# Patient Record
Sex: FEMALE | Race: WHITE | NOT HISPANIC OR LATINO | Employment: OTHER | URBAN - METROPOLITAN AREA
[De-identification: names, ages, dates, MRNs, and addresses within clinical notes are randomized per-mention and may not be internally consistent; named-entity substitution may affect disease eponyms.]

---

## 2017-06-20 ENCOUNTER — OFFICE VISIT (OUTPATIENT)
Dept: SURGERY | Facility: CLINIC | Age: 72
End: 2017-06-20

## 2017-06-20 VITALS
SYSTOLIC BLOOD PRESSURE: 110 MMHG | HEART RATE: 74 BPM | DIASTOLIC BLOOD PRESSURE: 60 MMHG | WEIGHT: 160.3 LBS | HEIGHT: 68 IN | BODY MASS INDEX: 24.29 KG/M2 | OXYGEN SATURATION: 97 %

## 2017-06-20 DIAGNOSIS — K42.9 UMBILICAL HERNIA WITHOUT OBSTRUCTION AND WITHOUT GANGRENE: Primary | ICD-10-CM

## 2017-06-20 PROCEDURE — 99203 OFFICE O/P NEW LOW 30 MIN: CPT | Performed by: SURGERY

## 2017-06-20 RX ORDER — ASPIRIN 81 MG/1
81 TABLET ORAL DAILY
COMMUNITY
End: 2018-09-17

## 2017-06-20 RX ORDER — FLUTICASONE PROPIONATE 50 MCG
1 SPRAY, SUSPENSION (ML) NASAL EVERY EVENING
COMMUNITY
End: 2019-06-25

## 2017-06-20 RX ORDER — MONTELUKAST SODIUM 10 MG/1
10 TABLET ORAL NIGHTLY
COMMUNITY

## 2017-06-20 RX ORDER — EZETIMIBE 10 MG/1
10 TABLET ORAL DAILY
COMMUNITY
End: 2017-09-21

## 2017-06-20 RX ORDER — HYDROCODONE BITARTRATE AND ACETAMINOPHEN 5; 325 MG/1; MG/1
1 TABLET ORAL
COMMUNITY
End: 2017-07-06

## 2017-06-20 RX ORDER — MULTIVIT-MIN/IRON/FOLIC/HRB186 3.3 MG-25
1 TABLET ORAL DAILY
COMMUNITY

## 2017-06-20 RX ORDER — GABAPENTIN 100 MG/1
100 CAPSULE ORAL 3 TIMES DAILY
COMMUNITY
End: 2017-07-06

## 2017-06-20 RX ORDER — ROSUVASTATIN CALCIUM 5 MG/1
5 TABLET, COATED ORAL
COMMUNITY

## 2017-06-20 RX ORDER — LATANOPROST 50 UG/ML
1 SOLUTION/ DROPS OPHTHALMIC
COMMUNITY
End: 2017-07-06

## 2017-06-20 RX ORDER — CHLORAL HYDRATE 500 MG
2 CAPSULE ORAL
COMMUNITY
End: 2017-07-06

## 2017-06-20 RX ORDER — ALPRAZOLAM 0.5 MG/1
0.5 TABLET ORAL 3 TIMES DAILY PRN
Status: ON HOLD | COMMUNITY
End: 2017-11-16

## 2017-06-20 NOTE — PROGRESS NOTES
Subjective   Elsie Burleson is a 72 y.o. female who presents to the office in surgical consultation from Zoran Piedra MD for an umbilical hernia.    History of Present Illness     The patient has had a several month history of mild discomfort at the umbilicus.  The discomfort was present when pressure was applied especially with the waistband of pants.  Over the past 3 weeks the discomfort became pain.  She has also noticed a bulge at the umbilicus.  For a short period of time and it was firm but then reduced and has been much softer.  She has not had any changes in her bowel or bladder function.    The patient also has chronic back pain and has had an epidural injection which provided relief.  It has now recurred and she is scheduled to have another epidural injection.  She was given hydrocodone to help relieve the pain and has developed nausea that she attributes to the hydrocodone.  Her appetite has been good and eating does not change the nausea.  Her bowel function has remained normal.    Review of Systems   Constitutional: Negative for activity change, appetite change, fatigue and fever.   HENT: Negative for trouble swallowing and voice change.    Respiratory: Negative for chest tightness and shortness of breath.    Cardiovascular: Negative for chest pain and palpitations.   Gastrointestinal: Negative for abdominal pain, blood in stool, constipation, diarrhea, nausea and vomiting.   Endocrine: Negative for cold intolerance and heat intolerance.   Genitourinary: Negative for dysuria and flank pain.   Neurological: Negative for dizziness and light-headedness.   Hematological: Negative for adenopathy. Does not bruise/bleed easily.   Psychiatric/Behavioral: Negative for agitation and confusion.     Past Medical History:   Diagnosis Date   • Abdominal pain    • Arthritis    • Sleep apnea      Past Surgical History:   Procedure Laterality Date   • APPENDECTOMY     • COLONOSCOPY     • FOOT SURGERY     •  HYSTERECTOMY     • PELVIC FLOOR REPAIR     • SHOULDER ARTHROSCOPY       Family History   Problem Relation Age of Onset   • Heart disease Mother    • Heart disease Father    • Heart disease Brother      Social History     Social History   • Marital status:      Spouse name: N/A   • Number of children: N/A   • Years of education: N/A     Occupational History   • retired      Social History Main Topics   • Smoking status: Former Smoker   • Smokeless tobacco: Never Used   • Alcohol use 0.6 oz/week     1 Glasses of wine per week      Comment: per day   • Drug use: No   • Sexual activity: Defer     Other Topics Concern   • Not on file     Social History Narrative   • No narrative on file       Objective   Physical Exam   Constitutional: She is oriented to person, place, and time. She appears well-developed and well-nourished.  Non-toxic appearance.   Eyes: EOM are normal. No scleral icterus.   Pulmonary/Chest: Effort normal. No respiratory distress.   Abdominal: Soft. Normal appearance. There is no tenderness. A hernia is present. Hernia confirmed positive in the ventral area (Small reducible umbilical hernia that is very tender to palpation).   Neurological: She is alert and oriented to person, place, and time.   Skin: Skin is warm and dry.   Psychiatric: She has a normal mood and affect. Her behavior is normal. Judgment and thought content normal.       Assessment/Plan       The encounter diagnosis was Umbilical hernia without obstruction and without gangrene.    The patient has a symptomatic umbilical hernia.  She has been scheduled for an umbilical hernia repair.  The patient understands the indications, alternatives, risks, and benefits of the procedure and wishes to proceed.

## 2017-07-06 ENCOUNTER — APPOINTMENT (OUTPATIENT)
Dept: PREADMISSION TESTING | Facility: HOSPITAL | Age: 72
End: 2017-07-06

## 2017-07-06 VITALS
BODY MASS INDEX: 23.69 KG/M2 | RESPIRATION RATE: 16 BRPM | OXYGEN SATURATION: 98 % | SYSTOLIC BLOOD PRESSURE: 127 MMHG | TEMPERATURE: 97.6 F | DIASTOLIC BLOOD PRESSURE: 66 MMHG | HEIGHT: 68 IN | WEIGHT: 156.3 LBS | HEART RATE: 79 BPM

## 2017-07-06 LAB
ANION GAP SERPL CALCULATED.3IONS-SCNC: 16.1 MMOL/L
BUN BLD-MCNC: 19 MG/DL (ref 8–23)
BUN/CREAT SERPL: 25 (ref 7–25)
CALCIUM SPEC-SCNC: 9.8 MG/DL (ref 8.6–10.5)
CHLORIDE SERPL-SCNC: 102 MMOL/L (ref 98–107)
CO2 SERPL-SCNC: 20.9 MMOL/L (ref 22–29)
CREAT BLD-MCNC: 0.76 MG/DL (ref 0.57–1)
DEPRECATED RDW RBC AUTO: 42.1 FL (ref 37–54)
ERYTHROCYTE [DISTWIDTH] IN BLOOD BY AUTOMATED COUNT: 12.4 % (ref 11.7–13)
GFR SERPL CREATININE-BSD FRML MDRD: 75 ML/MIN/1.73
GLUCOSE BLD-MCNC: 114 MG/DL (ref 65–99)
HCT VFR BLD AUTO: 41.2 % (ref 35.6–45.5)
HGB BLD-MCNC: 13.8 G/DL (ref 11.9–15.5)
MCH RBC QN AUTO: 31.2 PG (ref 26.9–32)
MCHC RBC AUTO-ENTMCNC: 33.5 G/DL (ref 32.4–36.3)
MCV RBC AUTO: 93 FL (ref 80.5–98.2)
PLATELET # BLD AUTO: 254 10*3/MM3 (ref 140–500)
PMV BLD AUTO: 10.5 FL (ref 6–12)
POTASSIUM BLD-SCNC: 4.2 MMOL/L (ref 3.5–5.2)
RBC # BLD AUTO: 4.43 10*6/MM3 (ref 3.9–5.2)
SODIUM BLD-SCNC: 139 MMOL/L (ref 136–145)
WBC NRBC COR # BLD: 8.51 10*3/MM3 (ref 4.5–10.7)

## 2017-07-06 PROCEDURE — 93005 ELECTROCARDIOGRAM TRACING: CPT

## 2017-07-06 PROCEDURE — 93010 ELECTROCARDIOGRAM REPORT: CPT | Performed by: INTERNAL MEDICINE

## 2017-07-06 PROCEDURE — 36415 COLL VENOUS BLD VENIPUNCTURE: CPT

## 2017-07-06 PROCEDURE — 85027 COMPLETE CBC AUTOMATED: CPT | Performed by: SURGERY

## 2017-07-06 PROCEDURE — 80048 BASIC METABOLIC PNL TOTAL CA: CPT | Performed by: SURGERY

## 2017-07-06 NOTE — DISCHARGE INSTRUCTIONS
Take the following medications the morning of surgery with a small sip of water:  NEXIUM    Arrive to hospital on your day of surgery at 7:30 AM    General Instructions:  • Do not eat or drink anything after midnight the night before surgery.  • Infants may have breast milk up to four hours before surgery.  • Infants drinking formula may drink formula up to six hours before surgery.   • Patients who avoid smoking, chewing tobacco and alcohol for 4 weeks prior to surgery have a reduced risk of post-operative complications.  Quit smoking as many days before surgery as you can.  • Do not smoke, use chewing tobacco or drink alcohol the day of surgery.   • If applicable bring your C-PAP/ BI-PAP machine.  • Bring any papers given to you in the doctor’s office.  • Wear clean comfortable clothes and socks.  • Do not wear contact lenses or make-up.  Bring a case for your glasses.   • Bring crutches or walker if applicable.  • Leave all other valuables and jewelry at home.  • The Pre-Admission Testing nurse will instruct you to bring medications if unable to obtain an accurate list in Pre-Admission Testing.        If you were given a blood bank ID arm band remember to bring it with you the day of surgery.    Preventing a Surgical Site Infection:  • For 2 to 3 days before surgery, avoid shaving with a razor because the razor can irritate skin and make it easier to develop an infection.  • The night prior to surgery sleep in a clean bed with clean clothing.  Do not allow pets to sleep with you.  • Shower on the morning of surgery using a fresh bar of anti-bacterial soap (such as Dial) and clean washcloth.  Dry with a clean towel and dress in clean clothing.  • Ask your surgeon if you will be receiving antibiotics prior to surgery.  • Make sure you, your family, and all healthcare providers clean their hands with soap and water or an alcohol based hand  before caring for you or your wound.    Day of surgery:  Upon  arrival, a Pre-op nurse and Anesthesiologist will review your health history, obtain vital signs, and answer questions you may have.  The only belongings needed at this time will be your home medications and if applicable your C-PAP/BI-PAP machine.  If you are staying overnight your family can leave the rest of your belongings in the car and bring them to your room later.  A Pre-op nurse will start an IV and you may receive medication in preparation for surgery, including something to help you relax.  Your family will be able to see you in the Pre-op area.  While you are in surgery your family should notify the waiting room  if they leave the waiting room area and provide a contact phone number.    Please be aware that surgery does come with discomfort.  We want to make every effort to control your discomfort so please discuss any uncontrolled symptoms with your nurse.   Your doctor will most likely have prescribed pain medications.      If you are going home after surgery you will receive individualized written care instructions before being discharged.  A responsible adult must drive you to and from the hospital on the day of your surgery and stay with you for 24 hours.    If you are staying overnight following surgery, you will be transported to your hospital room following the recovery period.  [facility] has all private rooms.    If you have any questions please call Pre-Admission Testing at [phone].  Deductibles and co-payments are collected on the day of service. Please be prepared to pay the required co-pay, deductible or deposit on the day of service as defined by your plan.

## 2017-07-17 ENCOUNTER — HOSPITAL ENCOUNTER (OUTPATIENT)
Facility: HOSPITAL | Age: 72
Setting detail: HOSPITAL OUTPATIENT SURGERY
Discharge: HOME | End: 2017-07-17
Attending: SURGERY | Admitting: SURGERY

## 2017-07-17 ENCOUNTER — ANESTHESIA EVENT (OUTPATIENT)
Dept: PERIOP | Facility: HOSPITAL | Age: 72
End: 2017-07-17

## 2017-07-17 ENCOUNTER — ANESTHESIA (OUTPATIENT)
Dept: PERIOP | Facility: HOSPITAL | Age: 72
End: 2017-07-17

## 2017-07-17 VITALS
WEIGHT: 155 LBS | RESPIRATION RATE: 18 BRPM | DIASTOLIC BLOOD PRESSURE: 65 MMHG | BODY MASS INDEX: 23.49 KG/M2 | HEART RATE: 64 BPM | TEMPERATURE: 97.6 F | HEIGHT: 68 IN | OXYGEN SATURATION: 97 % | SYSTOLIC BLOOD PRESSURE: 123 MMHG

## 2017-07-17 PROCEDURE — 25010000002 DEXAMETHASONE PER 1 MG: Performed by: NURSE ANESTHETIST, CERTIFIED REGISTERED

## 2017-07-17 PROCEDURE — 63710000001 PROMETHAZINE PER 25 MG: Performed by: NURSE ANESTHETIST, CERTIFIED REGISTERED

## 2017-07-17 PROCEDURE — 25010000002 PROPOFOL 10 MG/ML EMULSION: Performed by: NURSE ANESTHETIST, CERTIFIED REGISTERED

## 2017-07-17 PROCEDURE — 25010000002 KETOROLAC TROMETHAMINE PER 15 MG: Performed by: NURSE ANESTHETIST, CERTIFIED REGISTERED

## 2017-07-17 PROCEDURE — C1781 MESH (IMPLANTABLE): HCPCS | Performed by: SURGERY

## 2017-07-17 PROCEDURE — 25010000002 FENTANYL CITRATE (PF) 100 MCG/2ML SOLUTION: Performed by: NURSE ANESTHETIST, CERTIFIED REGISTERED

## 2017-07-17 PROCEDURE — 49585 PR REPAIR UMBILICAL HERN,5+Y/O,REDUC: CPT | Performed by: SURGERY

## 2017-07-17 PROCEDURE — 25010000002 MIDAZOLAM PER 1 MG

## 2017-07-17 PROCEDURE — 25010000002 ONDANSETRON PER 1 MG: Performed by: NURSE ANESTHETIST, CERTIFIED REGISTERED

## 2017-07-17 DEVICE — VENTRALEX ST HERNIA PATCH
Type: IMPLANTABLE DEVICE | Status: FUNCTIONAL
Brand: VENTRALEX ST HERNIA PATCH

## 2017-07-17 RX ORDER — PROMETHAZINE HYDROCHLORIDE 25 MG/1
TABLET ORAL
Status: DISCONTINUED
Start: 2017-07-17 | End: 2017-07-17 | Stop reason: HOSPADM

## 2017-07-17 RX ORDER — LABETALOL HYDROCHLORIDE 5 MG/ML
5 INJECTION, SOLUTION INTRAVENOUS
Status: DISCONTINUED | OUTPATIENT
Start: 2017-07-17 | End: 2017-07-17 | Stop reason: HOSPADM

## 2017-07-17 RX ORDER — FAMOTIDINE 10 MG/ML
INJECTION, SOLUTION INTRAVENOUS
Status: DISCONTINUED
Start: 2017-07-17 | End: 2017-07-17 | Stop reason: HOSPADM

## 2017-07-17 RX ORDER — EPHEDRINE SULFATE 50 MG/ML
5 INJECTION, SOLUTION INTRAVENOUS ONCE AS NEEDED
Status: DISCONTINUED | OUTPATIENT
Start: 2017-07-17 | End: 2017-07-17 | Stop reason: HOSPADM

## 2017-07-17 RX ORDER — SODIUM CHLORIDE, SODIUM LACTATE, POTASSIUM CHLORIDE, CALCIUM CHLORIDE 600; 310; 30; 20 MG/100ML; MG/100ML; MG/100ML; MG/100ML
9 INJECTION, SOLUTION INTRAVENOUS CONTINUOUS PRN
Status: DISCONTINUED | OUTPATIENT
Start: 2017-07-17 | End: 2017-07-17 | Stop reason: HOSPADM

## 2017-07-17 RX ORDER — PROPOFOL 10 MG/ML
VIAL (ML) INTRAVENOUS AS NEEDED
Status: DISCONTINUED | OUTPATIENT
Start: 2017-07-17 | End: 2017-07-17 | Stop reason: SURG

## 2017-07-17 RX ORDER — BUPIVACAINE HYDROCHLORIDE AND EPINEPHRINE 5; 5 MG/ML; UG/ML
INJECTION, SOLUTION PERINEURAL AS NEEDED
Status: DISCONTINUED | OUTPATIENT
Start: 2017-07-17 | End: 2017-07-17 | Stop reason: HOSPADM

## 2017-07-17 RX ORDER — OXYCODONE HYDROCHLORIDE AND ACETAMINOPHEN 5; 325 MG/1; MG/1
TABLET ORAL
Qty: 30 TABLET | Refills: 0 | Status: SHIPPED | OUTPATIENT
Start: 2017-07-17 | End: 2017-09-20

## 2017-07-17 RX ORDER — LIDOCAINE HYDROCHLORIDE 20 MG/ML
INJECTION, SOLUTION INFILTRATION; PERINEURAL AS NEEDED
Status: DISCONTINUED | OUTPATIENT
Start: 2017-07-17 | End: 2017-07-17 | Stop reason: SURG

## 2017-07-17 RX ORDER — NALOXONE HCL 0.4 MG/ML
0.2 VIAL (ML) INJECTION AS NEEDED
Status: DISCONTINUED | OUTPATIENT
Start: 2017-07-17 | End: 2017-07-17 | Stop reason: HOSPADM

## 2017-07-17 RX ORDER — SODIUM CHLORIDE 0.9 % (FLUSH) 0.9 %
1-10 SYRINGE (ML) INJECTION AS NEEDED
Status: DISCONTINUED | OUTPATIENT
Start: 2017-07-17 | End: 2017-07-17 | Stop reason: HOSPADM

## 2017-07-17 RX ORDER — ONDANSETRON 2 MG/ML
4 INJECTION INTRAMUSCULAR; INTRAVENOUS ONCE AS NEEDED
Status: DISCONTINUED | OUTPATIENT
Start: 2017-07-17 | End: 2017-07-17 | Stop reason: HOSPADM

## 2017-07-17 RX ORDER — HYDRALAZINE HYDROCHLORIDE 20 MG/ML
5 INJECTION INTRAMUSCULAR; INTRAVENOUS
Status: DISCONTINUED | OUTPATIENT
Start: 2017-07-17 | End: 2017-07-17 | Stop reason: HOSPADM

## 2017-07-17 RX ORDER — FENTANYL CITRATE 50 UG/ML
INJECTION, SOLUTION INTRAMUSCULAR; INTRAVENOUS AS NEEDED
Status: DISCONTINUED | OUTPATIENT
Start: 2017-07-17 | End: 2017-07-17 | Stop reason: SURG

## 2017-07-17 RX ORDER — ONDANSETRON 2 MG/ML
INJECTION INTRAMUSCULAR; INTRAVENOUS AS NEEDED
Status: DISCONTINUED | OUTPATIENT
Start: 2017-07-17 | End: 2017-07-17 | Stop reason: SURG

## 2017-07-17 RX ORDER — FLUMAZENIL 0.1 MG/ML
0.2 INJECTION INTRAVENOUS AS NEEDED
Status: DISCONTINUED | OUTPATIENT
Start: 2017-07-17 | End: 2017-07-17 | Stop reason: HOSPADM

## 2017-07-17 RX ORDER — DIPHENHYDRAMINE HYDROCHLORIDE 50 MG/ML
12.5 INJECTION INTRAMUSCULAR; INTRAVENOUS
Status: DISCONTINUED | OUTPATIENT
Start: 2017-07-17 | End: 2017-07-17 | Stop reason: HOSPADM

## 2017-07-17 RX ORDER — OXYCODONE AND ACETAMINOPHEN 7.5; 325 MG/1; MG/1
1 TABLET ORAL ONCE AS NEEDED
Status: DISCONTINUED | OUTPATIENT
Start: 2017-07-17 | End: 2017-07-17 | Stop reason: HOSPADM

## 2017-07-17 RX ORDER — DEXAMETHASONE SODIUM PHOSPHATE 10 MG/ML
INJECTION INTRAMUSCULAR; INTRAVENOUS AS NEEDED
Status: DISCONTINUED | OUTPATIENT
Start: 2017-07-17 | End: 2017-07-17 | Stop reason: SURG

## 2017-07-17 RX ORDER — MIDAZOLAM HYDROCHLORIDE 1 MG/ML
INJECTION INTRAMUSCULAR; INTRAVENOUS
Status: COMPLETED
Start: 2017-07-17 | End: 2017-07-17

## 2017-07-17 RX ORDER — MAGNESIUM HYDROXIDE 1200 MG/15ML
LIQUID ORAL AS NEEDED
Status: DISCONTINUED | OUTPATIENT
Start: 2017-07-17 | End: 2017-07-17 | Stop reason: HOSPADM

## 2017-07-17 RX ORDER — KETOROLAC TROMETHAMINE 30 MG/ML
INJECTION, SOLUTION INTRAMUSCULAR; INTRAVENOUS AS NEEDED
Status: DISCONTINUED | OUTPATIENT
Start: 2017-07-17 | End: 2017-07-17 | Stop reason: SURG

## 2017-07-17 RX ORDER — HYDROCODONE BITARTRATE AND ACETAMINOPHEN 7.5; 325 MG/1; MG/1
1 TABLET ORAL ONCE AS NEEDED
Status: COMPLETED | OUTPATIENT
Start: 2017-07-17 | End: 2017-07-17

## 2017-07-17 RX ORDER — HYDROMORPHONE HYDROCHLORIDE 1 MG/ML
0.5 INJECTION, SOLUTION INTRAMUSCULAR; INTRAVENOUS; SUBCUTANEOUS
Status: DISCONTINUED | OUTPATIENT
Start: 2017-07-17 | End: 2017-07-17 | Stop reason: HOSPADM

## 2017-07-17 RX ORDER — PROMETHAZINE HYDROCHLORIDE 25 MG/1
12.5 TABLET ORAL ONCE AS NEEDED
Status: COMPLETED | OUTPATIENT
Start: 2017-07-17 | End: 2017-07-17

## 2017-07-17 RX ORDER — MIDAZOLAM HYDROCHLORIDE 1 MG/ML
1 INJECTION INTRAMUSCULAR; INTRAVENOUS
Status: DISCONTINUED | OUTPATIENT
Start: 2017-07-17 | End: 2017-07-17 | Stop reason: HOSPADM

## 2017-07-17 RX ORDER — MIDAZOLAM HYDROCHLORIDE 1 MG/ML
2 INJECTION INTRAMUSCULAR; INTRAVENOUS
Status: DISCONTINUED | OUTPATIENT
Start: 2017-07-17 | End: 2017-07-17 | Stop reason: HOSPADM

## 2017-07-17 RX ORDER — FENTANYL CITRATE 50 UG/ML
50 INJECTION, SOLUTION INTRAMUSCULAR; INTRAVENOUS
Status: DISCONTINUED | OUTPATIENT
Start: 2017-07-17 | End: 2017-07-17 | Stop reason: HOSPADM

## 2017-07-17 RX ORDER — GLYCOPYRROLATE 0.2 MG/ML
INJECTION INTRAMUSCULAR; INTRAVENOUS AS NEEDED
Status: DISCONTINUED | OUTPATIENT
Start: 2017-07-17 | End: 2017-07-17 | Stop reason: SURG

## 2017-07-17 RX ADMIN — SODIUM CHLORIDE, POTASSIUM CHLORIDE, SODIUM LACTATE AND CALCIUM CHLORIDE: 600; 310; 30; 20 INJECTION, SOLUTION INTRAVENOUS at 08:41

## 2017-07-17 RX ADMIN — HYDROCODONE BITARTRATE AND ACETAMINOPHEN 1 TABLET: 7.5; 325 TABLET ORAL at 10:37

## 2017-07-17 RX ADMIN — GLYCOPYRROLATE 0.2 MG: 0.2 INJECTION INTRAMUSCULAR; INTRAVENOUS at 09:32

## 2017-07-17 RX ADMIN — PROPOFOL 200 MG: 10 INJECTION, EMULSION INTRAVENOUS at 08:46

## 2017-07-17 RX ADMIN — FENTANYL CITRATE 50 MCG: 50 INJECTION INTRAMUSCULAR; INTRAVENOUS at 08:57

## 2017-07-17 RX ADMIN — MIDAZOLAM HYDROCHLORIDE 1 MG: 1 INJECTION INTRAMUSCULAR; INTRAVENOUS at 08:33

## 2017-07-17 RX ADMIN — DEXAMETHASONE SODIUM PHOSPHATE 8 MG: 10 INJECTION INTRAMUSCULAR; INTRAVENOUS at 09:29

## 2017-07-17 RX ADMIN — LIDOCAINE HYDROCHLORIDE 60 MG: 20 INJECTION, SOLUTION INFILTRATION; PERINEURAL at 08:46

## 2017-07-17 RX ADMIN — SODIUM CHLORIDE, POTASSIUM CHLORIDE, SODIUM LACTATE AND CALCIUM CHLORIDE: 600; 310; 30; 20 INJECTION, SOLUTION INTRAVENOUS at 09:43

## 2017-07-17 RX ADMIN — PROMETHAZINE HYDROCHLORIDE 12.5 MG: 25 TABLET ORAL at 10:04

## 2017-07-17 RX ADMIN — KETOROLAC TROMETHAMINE 15 MG: 30 INJECTION, SOLUTION INTRAMUSCULAR; INTRAVENOUS at 09:29

## 2017-07-17 RX ADMIN — ONDANSETRON 4 MG: 2 INJECTION INTRAMUSCULAR; INTRAVENOUS at 09:29

## 2017-07-17 RX ADMIN — FENTANYL CITRATE 50 MCG: 50 INJECTION INTRAMUSCULAR; INTRAVENOUS at 10:35

## 2017-07-17 RX ADMIN — FENTANYL CITRATE 50 MCG: 50 INJECTION INTRAMUSCULAR; INTRAVENOUS at 10:13

## 2017-07-17 RX ADMIN — MIDAZOLAM 1 MG: 1 INJECTION INTRAMUSCULAR; INTRAVENOUS at 08:33

## 2017-07-17 NOTE — PLAN OF CARE
Problem: Patient Care Overview (Adult)  Goal: Plan of Care Review  Outcome: Ongoing (interventions implemented as appropriate)    07/17/17 1040   Coping/Psychosocial Response Interventions   Plan Of Care Reviewed With patient   Patient Care Overview   Progress improving   Outcome Evaluation   Outcome Summary/Follow up Plan decrease nausea, pain tolerable, vss         Problem: Perioperative Period (Adult)  Goal: Signs and Symptoms of Listed Potential Problems Will be Absent or Manageable (Perioperative Period)  Outcome: Ongoing (interventions implemented as appropriate)

## 2017-07-17 NOTE — PLAN OF CARE
Problem: Patient Care Overview (Adult)  Goal: Plan of Care Review  Outcome: Outcome(s) achieved Date Met:  07/17/17 07/17/17 1148   Coping/Psychosocial Response Interventions   Plan Of Care Reviewed With patient;spouse   Outcome Evaluation   Outcome Summary/Follow up Plan READY FOR DISCHARGE

## 2017-07-17 NOTE — PLAN OF CARE
Problem: Perioperative Period (Adult)  Goal: Signs and Symptoms of Listed Potential Problems Will be Absent or Manageable (Perioperative Period)  Outcome: Outcome(s) achieved Date Met:  07/17/17 07/17/17 1148   Perioperative Period   Problems Present (Perioperative Period) none

## 2017-07-17 NOTE — OP NOTE
Surgeon: Haseeb Monterroso Jr., M.D.    Assistant: None    Pre-Operative Diagnosis: Umbilical hernia    Post-Operative Diagnosis: Umbilical hernia    Procedure Performed: Umbilical hernia repair with mesh    Indications:     The patient is a very pleasant 72-year-old white female who is had a bulge at the umbilicus that has slowly gotten larger and increasingly symptomatic.  She was diagnosed with an umbilical hernia.  She presents for umbilical hernia repair with mesh.  The patient understands the indications, alternatives, risks, and benefits of the procedure and wishes to proceed.    Procedure:     The patient was identified and taken to the operating room where she was placed in the supine position on the operating table.  Monitors were placed and she underwent general endotracheal anesthesia and was appropriately monitored by the anesthesia personnel.  The abdomen and the peña-umbilical region was prepped and draped in the standard surgical fashion.  A periumbilical incision was made and carried through the skin into the subcutaneous tissue.  The subcutaneous tissue was divided using Bovie electrocautery down to the level of the fascia and the hernia was surrounded.  The umbilicus was then taken off the hernia sac using Bovie electrocautery.  The hernia sac was freed from all subcutaneous attachments all the way down to the fascial defect.  The hernia was freed at the fascial edge using Bovie electrocautery and completely reduced.  The hernia defect was about 4 cm in size.  The preperitoneal space was dissected to permit the mesh.  A piece of Ventralex ST mesh, size medium, was selected and placed in a subfascial position in the preperitoneal space.  The fascia was then closed with 0 PDS sutures in an interrupted fashion grasping the legs of the mesh within the closure to secure the mesh in the proper position.  The entire area was then infiltrated with 0.5% Marcaine with epinephrine.  The umbilicus was tacked  down to the fascia using 3-0 Vicryls suture in an interrupted fashion.  The subcutaneous tissue was then closed with 3-0 Vicryls suture in a running fashion.  The skin was then closed with a 4-0 Monocryl in a subcuticular fashion followed by benzoin and Steri-Strips.  A sterile dressing was applied.  The sponge, needle, and instrument counts were correct at the end the case.  The patient tolerated procedure well and was transferred to the recovery room in stable condition.

## 2017-07-17 NOTE — PLAN OF CARE
Problem: Perioperative Period (Adult)  Goal: Signs and Symptoms of Listed Potential Problems Will be Absent or Manageable (Perioperative Period)  Outcome: Ongoing (interventions implemented as appropriate)    07/17/17 0820   Perioperative Period   Problems Assessed (Perioperative Period) pain   Problems Present (Perioperative Period) none

## 2017-07-17 NOTE — ANESTHESIA PROCEDURE NOTES
Airway  Urgency: elective    Airway not difficult    General Information and Staff    Patient location during procedure: OR  Anesthesiologist: DEBORAH DUNBAR  CRNA: BRENDON HARMON    Indications and Patient Condition  Indications for airway management: airway protection    Preoxygenated: yes  MILS maintained throughout  Mask difficulty assessment: 1 - vent by mask    Final Airway Details  Final airway type: supraglottic airway      Successful airway: classic  Size 4    Number of attempts at approach: 1    Additional Comments  Pt preoxygenated, sivi, LMA placed with adequate seal and TV

## 2017-07-17 NOTE — ANESTHESIA PREPROCEDURE EVALUATION
Anesthesia Evaluation     Patient summary reviewed   NPO Solid Status: > 8 hours       Airway   Mallampati: I  Neck ROM: full  no difficulty expected  Dental - normal exam     Pulmonary    (+) sleep apnea,   Cardiovascular     Rhythm: regular        Neuro/Psych  GI/Hepatic/Renal/Endo      Musculoskeletal     Abdominal    Substance History      OB/GYN          Other                                        Anesthesia Plan    ASA 2     general     intravenous induction   Anesthetic plan and risks discussed with patient.  Use of blood products discussed with patient .

## 2017-07-17 NOTE — ANESTHESIA POSTPROCEDURE EVALUATION
Patient: Elsie Burleson    Procedure Summary     Date Anesthesia Start Anesthesia Stop Room / Location    07/17/17 0841 [id number]  [facility]       Procedure Diagnosis Surgeon Provider    UMBILICAL HERNIA REPAIR (N/A Abdomen) Umbilical hernia without obstruction and without gangrene  (Umbilical hernia without obstruction and without gangrene [K42.9]) MD Lucas Lala Jr., MD          Anesthesia Type: general  Last vitals  /65 (07/17/17 1140)    Temp      Pulse 64 (07/17/17 1140)   Resp 18 (07/17/17 1140)    SpO2 97 % (07/17/17 1140)      Post Anesthesia Care and Evaluation    Patient location during evaluation: PACU  Patient participation: complete - patient participated  Level of consciousness: sleepy but conscious  Pain score: 0  Pain management: adequate  Airway patency: patent  Anesthetic complications: No anesthetic complications    Cardiovascular status: acceptable  Respiratory status: acceptable  Hydration status: acceptable

## 2017-07-17 NOTE — PLAN OF CARE
Problem: Patient Care Overview (Adult)  Goal: Discharge Needs Assessment  Outcome: Outcome(s) achieved Date Met:  07/17/17 07/17/17 1149   Discharge Needs Assessment   Concerns To Be Addressed no discharge needs identified

## 2017-07-17 NOTE — PLAN OF CARE
Problem: Patient Care Overview (Adult)  Goal: Plan of Care Review  Outcome: Ongoing (interventions implemented as appropriate)    07/17/17 0820   Coping/Psychosocial Response Interventions   Plan Of Care Reviewed With patient   Patient Care Overview   Progress no change       Goal: Adult Individualization and Mutuality  Outcome: Ongoing (interventions implemented as appropriate)    07/17/17 0820   Individualization   Patient Specific Preferences goes by Cristino,  goes by Ramana

## 2017-07-27 ENCOUNTER — OFFICE VISIT (OUTPATIENT)
Dept: SURGERY | Facility: CLINIC | Age: 72
End: 2017-07-27

## 2017-07-27 VITALS
DIASTOLIC BLOOD PRESSURE: 74 MMHG | HEART RATE: 77 BPM | HEIGHT: 68 IN | OXYGEN SATURATION: 98 % | SYSTOLIC BLOOD PRESSURE: 122 MMHG | WEIGHT: 152.9 LBS | BODY MASS INDEX: 23.17 KG/M2

## 2017-07-27 DIAGNOSIS — Z48.89 POSTOPERATIVE VISIT: Primary | ICD-10-CM

## 2017-07-27 PROCEDURE — 99024 POSTOP FOLLOW-UP VISIT: CPT | Performed by: SURGERY

## 2017-07-27 RX ORDER — ONDANSETRON 4 MG/1
4 TABLET, FILM COATED ORAL EVERY 6 HOURS PRN
Qty: 20 TABLET | Refills: 0 | Status: ON HOLD | OUTPATIENT
Start: 2017-07-27 | End: 2017-11-16

## 2017-07-27 NOTE — PROGRESS NOTES
Subjective   Elsie Burleson is a 72 y.o. female who returns to the office after undergoing an umbilical hernia repair with mesh on 7/17/2017.     History of Present Illness     The patient is recovering well from surgery but has developed irritation at the surgery site.  She noticed significant itching and erythema of the skin.  She removed the Steri-Strips and has used Benadryl cream with some improvement.  There has been no drainage.  She has had some mild nausea.  Her energy level is slightly decreased.    Review of Systems   Constitutional: Positive for activity change, appetite change and fatigue. Negative for fever.   Respiratory: Negative for chest tightness and shortness of breath.    Cardiovascular: Negative for chest pain and palpitations.   Gastrointestinal: Negative for abdominal pain, constipation, diarrhea and nausea.   Skin: Negative for rash and wound.   Psychiatric/Behavioral: Negative for agitation and confusion.       Objective   Physical Exam   Constitutional:  Non-toxic appearance. She does not appear ill. No distress.   Pulmonary/Chest: Effort normal. No respiratory distress.   Abdominal: Soft. Normal appearance. There is no tenderness.   Neurological: She is alert.   Skin:   Incision: intact with no evidence of infection.  There is erythema around the incision that appears to be dermatitis.   Psychiatric: She has a normal mood and affect. Her behavior is normal.       Assessment/Plan   The encounter diagnosis was Postoperative visit.    The patient is recovering well from her umbilical hernia repair with mesh.  She has a dermatitis around the incision that is likely related to the adhesive used for the Steri-Strips.  She was advised to continue the Benadryl cream.  She was also advised that ice may help.  She was given a prescription for Zofran for the mild nausea.  She will follow-up on an as-needed basis.

## 2017-08-21 ENCOUNTER — HOSPITAL ENCOUNTER (OUTPATIENT)
Dept: SLEEP MEDICINE | Facility: HOSPITAL | Age: 72
Discharge: HOME | End: 2017-08-21
Admitting: INTERNAL MEDICINE

## 2017-08-21 PROCEDURE — G0463 HOSPITAL OUTPT CLINIC VISIT: HCPCS

## 2017-08-28 NOTE — PROGRESS NOTES
Sleep Disorder Follow Up    Patient Name: Elsie Burleson  Age/Sex: 72 y.o. female  : 1945  MRN: [id number]    Date of Encounter Visit: 17  Referring Provider: Meenakshi Andino MD  Place of Service: [facility]  Patient Care Team:  Zoran Piedra MD as PCP - General  Zoran Piedra MD as PCP - Family Medicine    PROBLEM LIST:  1. Mild obstructive sleep apnea  2. Excessive daytime sleepiness  3. Palpitations     History of Present Illness:  Elsie Burleson is a 72 y.o. female who was last seen in the office on 16 for a history of mild obstructive sleep apnea and excessive daytime sleepiness.    Since last visit patient has been on xanax, but denies any hypersomnia related to medication use. He denies having any more complications.   She got a new machine 3/2016.   Patient is on CPAP and uses it every night with no complaints from the mask or the pressure.  Patient uses a nasal pillow mask and reports that it fits well. Patient denies any air leak or excessive dry mouth.   Patient's equipment supplier is [employer].  Patient sleeps better and has a deeper sleep with the CPAP and feels more energy during the day time.  Current CPAP setting 7 cm H20.  ESS is 7.  Compliance download was reviewed and documented below.  Weight is down 2 lbs since last visit.  Other comorbidities include palpitations.     Review of Systems:  A ten-system review was conducted and was negative.   Please refer to the follow up sleep questionnaire page one for details.    Past Medical History:  Past medical, surgical, social, and family history, except as mentioned above, was unchanged from the last visit.     Past Medical History:   Diagnosis Date   • Abdominal pain    • Anxiety    • Arthritis    • GERD (gastroesophageal reflux disease)    • Glaucoma    • High cholesterol    • Irregular heart beat     ON MED SEVERAL YEARS   • Low back pain     GETTING EPIDURALS, DR CHAPA   • PONV (postoperative  nausea and vomiting)    • Sleep apnea    • Sleep apnea with use of continuous positive airway pressure (CPAP)    • Umbilical hernia        Past Surgical History:   Procedure Laterality Date   • APPENDECTOMY     • BUNIONECTOMY Bilateral    • CATARACT EXTRACTION Bilateral    • COLONOSCOPY     • GLAUCOMA SURGERY Bilateral    • HYSTERECTOMY     • SEN'S NEUROMA EXCISION Left    • PELVIC FLOOR REPAIR     • SHOULDER ARTHROSCOPY Right    • UMBILICAL HERNIA REPAIR N/A 7/17/2017    Procedure: UMBILICAL HERNIA REPAIR;  Surgeon: Haseeb Montreroso Jr., MD;  Location: [facility] OR;  Service:        Home Medications:     Current Outpatient Prescriptions:   •  ALPRAZolam (XANAX) 0.5 MG tablet, Take 0.5 mg by mouth 3 (Three) Times a Day As Needed., Disp: , Rfl:   •  aspirin 81 MG EC tablet, Take 81 mg by mouth Daily. PT TO HOLD MED PRIOR TO OR, Disp: , Rfl:   •  Coenzyme Q10-Levocarnitine 100-20 MG capsule, Take 1 tablet by mouth Daily. PT TO HOLD MED PRIOR TO OR, Disp: , Rfl:   •  esomeprazole (nexIUM) 20 MG capsule, Take 20 mg by mouth Every Morning Before Breakfast., Disp: , Rfl:   •  ezetimibe (ZETIA) 10 MG tablet, Take 10 mg by mouth Daily., Disp: , Rfl:   •  fluticasone (FLONASE) 50 MCG/ACT nasal spray, 1 spray into each nostril Every Evening., Disp: , Rfl:   •  Glucosamine-Chondroitin 250-200 MG capsule, Take 1 capsule by mouth Daily., Disp: , Rfl:   •  montelukast (SINGULAIR) 10 MG tablet, Take 10 mg by mouth Every Night., Disp: , Rfl:   •  Nutritional Supplements (JUICE PLUS FIBRE PO), Take 2 tablets by mouth Daily., Disp: , Rfl:   •  ondansetron (ZOFRAN) 4 MG tablet, Take 1 tablet by mouth Every 6 (Six) Hours As Needed for Nausea or Vomiting for up to 20 doses., Disp: 20 tablet, Rfl: 0  •  oxyCODONE-acetaminophen (PERCOCET) 5-325 MG per tablet, 1-2 q 6 hrs prn pain, Disp: 30 tablet, Rfl: 0  •  rosuvastatin (CRESTOR) 5 MG tablet, Take 5 mg by mouth. MONDAYS, WEDNESDAYS AND FRIDAYS, Disp: , Rfl:   •  Unable to find, Take 1  each by mouth Daily. RELIEF FACTOR- 4 CAPSULES DAILY PT TO HOLD MED PRIOR TO OR, Disp: , Rfl:   •  verapamil SR (CALAN-SR) 180 MG CR tablet, Take 180 mg by mouth Every Night., Disp: , Rfl:     Allergies:  Allergies   Allergen Reactions   • Ceclor [Cefaclor] Itching and Swelling     THROAT SWELLING   • Cherry      DIFFICULTY BREATHING       Past Social History:  Social History     Social History   • Marital status:      Spouse name: N/A   • Number of children: N/A   • Years of education: N/A     Occupational History   • retired      Social History Main Topics   • Smoking status: Former Smoker     Types: Cigarettes   • Smokeless tobacco: Never Used      Comment: QUIT 22 YEARS AGO   • Alcohol use 0.6 oz/week     1 Glasses of wine per week      Comment: per day   • Drug use: No   • Sexual activity: Defer     Other Topics Concern   • Not on file     Social History Narrative       Past Family History:  Family History   Problem Relation Age of Onset   • Heart disease Mother    • Heart disease Father    • Heart disease Brother    • Malig Hyperthermia Neg Hx          Objective:   Done and documented on sleep disorders center physical examination sheet   VS: Ht: 68 inches, Wt: 156 lbs, BMI 24, /59, HR 67    Physical Exam:   General: AAOx3. Normal mood and affect.   HEENT:  Moist mucous membranes.  Septum midline. Mallampati 3 airways. Normal tongue and uvula. Normal tonsils.  LUNGS: Non-labored breathing. CTAB.  No wheezing  HEART: regular rate and rhythm. No murmur. Normal s1/s2  EXTREMITIES: no edema. No cyanosis or clubbing. Normal gait    Diagnostic Data:  Sleep study done in 2011 showed AHI of 10.3, RDI 11.7 on CPAP of 7.    Compliance download showed 100% compliance with 7 hours and 51 minutes per night. Residual AHI of 0.6 on CPAP at 7 cm H20 with 0 seconds of average air leak.     Assessment and Plan:     1. Obstructive sleep apnea, mild and well controlled on CPAP  2. Excessive daytime sleepiness with  improvement with CPAP use  3. Palpitations- resolved    Recommendations:     Patient has good compliance and feels well on therapy.   Continue CPAP at 7 cm H20.   Adherence to the PAP therapy is a key factor in successful treatment of GISSELLE and the patient was encouraged to contact us in case of problem with the CPAP or the mask that can limit the tolerance of the compliance with the therapy.    Follow up in 1 year or sooner if has any issues.     NAGI Ortiz dictating for Dr. Meenakshi Andino  [facility]   08/27/17  9:27 PM    EMR Dragon/Transcription disclaimer:   Much of this encounter note is an electronic transcription/translation of spoken language to printed text. The electronic translation of spoken language may permit erroneous, or at times, nonsensical words or phrases to be inadvertently transcribed; Although I have reviewed the note for such errors, some may still exist.

## 2017-09-12 ENCOUNTER — TRANSCRIBE ORDERS (OUTPATIENT)
Dept: ADMINISTRATIVE | Facility: HOSPITAL | Age: 72
End: 2017-09-12

## 2017-09-12 DIAGNOSIS — R11.0 NAUSEA: Primary | ICD-10-CM

## 2017-09-13 ENCOUNTER — HOSPITAL ENCOUNTER (OUTPATIENT)
Dept: ULTRASOUND IMAGING | Facility: HOSPITAL | Age: 72
Discharge: HOME | End: 2017-09-13
Attending: INTERNAL MEDICINE | Admitting: INTERNAL MEDICINE

## 2017-09-13 DIAGNOSIS — R11.0 NAUSEA: ICD-10-CM

## 2017-09-13 PROCEDURE — 76705 ECHO EXAM OF ABDOMEN: CPT

## 2017-09-19 ENCOUNTER — TRANSCRIBE ORDERS (OUTPATIENT)
Dept: ADMINISTRATIVE | Facility: HOSPITAL | Age: 72
End: 2017-09-19

## 2017-09-19 ENCOUNTER — HOSPITAL ENCOUNTER (OUTPATIENT)
Dept: CT IMAGING | Facility: HOSPITAL | Age: 72
Discharge: HOME | End: 2017-09-19
Attending: INTERNAL MEDICINE | Admitting: INTERNAL MEDICINE

## 2017-09-19 DIAGNOSIS — K86.89 PANCREATIC MASS: Primary | ICD-10-CM

## 2017-09-19 DIAGNOSIS — K86.89 PANCREATIC MASS: ICD-10-CM

## 2017-09-19 PROCEDURE — 82565 ASSAY OF CREATININE: CPT

## 2017-09-19 PROCEDURE — 74160 CT ABDOMEN W/CONTRAST: CPT

## 2017-09-19 PROCEDURE — 0 IOPAMIDOL 61 % SOLUTION: Performed by: INTERNAL MEDICINE

## 2017-09-19 PROCEDURE — 0 DIATRIZOATE MEGLUMINE & SODIUM PER 1 ML: Performed by: INTERNAL MEDICINE

## 2017-09-19 RX ADMIN — IOPAMIDOL 85 ML: 612 INJECTION, SOLUTION INTRAVENOUS at 14:50

## 2017-09-19 RX ADMIN — DIATRIZOATE MEGLUMINE AND DIATRIZOATE SODIUM 30 ML: 660; 100 LIQUID ORAL; RECTAL at 14:15

## 2017-09-20 ENCOUNTER — TRANSCRIBE ORDERS (OUTPATIENT)
Dept: ADMINISTRATIVE | Facility: HOSPITAL | Age: 72
End: 2017-09-20

## 2017-09-20 DIAGNOSIS — K86.89 PANCREATIC MASS: Primary | ICD-10-CM

## 2017-09-20 LAB — CREAT BLDA-MCNC: 0.7 MG/DL (ref 0.6–1.3)

## 2017-09-21 ENCOUNTER — LAB (OUTPATIENT)
Dept: OTHER | Facility: HOSPITAL | Age: 72
End: 2017-09-21

## 2017-09-21 ENCOUNTER — CONSULT (OUTPATIENT)
Dept: ONCOLOGY | Facility: CLINIC | Age: 72
End: 2017-09-21

## 2017-09-21 VITALS
HEART RATE: 85 BPM | BODY MASS INDEX: 23.04 KG/M2 | TEMPERATURE: 97.8 F | OXYGEN SATURATION: 98 % | SYSTOLIC BLOOD PRESSURE: 104 MMHG | WEIGHT: 152 LBS | HEIGHT: 68 IN | RESPIRATION RATE: 12 BRPM | DIASTOLIC BLOOD PRESSURE: 63 MMHG

## 2017-09-21 DIAGNOSIS — C25.1 MALIGNANT NEOPLASM OF BODY OF PANCREAS (HCC): ICD-10-CM

## 2017-09-21 DIAGNOSIS — R11.0 NAUSEA: ICD-10-CM

## 2017-09-21 DIAGNOSIS — K86.89 PANCREATIC MASS: Primary | ICD-10-CM

## 2017-09-21 DIAGNOSIS — R63.4 UNINTENTIONAL WEIGHT LOSS: ICD-10-CM

## 2017-09-21 DIAGNOSIS — R89.9 ABNORMAL LABORATORY TEST: Primary | ICD-10-CM

## 2017-09-21 PROCEDURE — 36415 COLL VENOUS BLD VENIPUNCTURE: CPT

## 2017-09-21 PROCEDURE — 99205 OFFICE O/P NEW HI 60 MIN: CPT | Performed by: INTERNAL MEDICINE

## 2017-09-21 RX ORDER — PROCHLORPERAZINE MALEATE 10 MG
10 TABLET ORAL EVERY 6 HOURS PRN
Qty: 60 TABLET | Refills: 5 | Status: ON HOLD | OUTPATIENT
Start: 2017-09-21 | End: 2017-11-16

## 2017-09-21 RX ORDER — OMEPRAZOLE 40 MG/1
40 CAPSULE, DELAYED RELEASE ORAL 2 TIMES DAILY
COMMUNITY
End: 2018-09-17

## 2017-09-21 NOTE — PROGRESS NOTES
Subjective .     REASON FOR CONSULTATION:     Provide an opinion on any further workup or treatment of a newly discovered large pancreatic body mass.                             REQUESTING PHYSICIAN: Zoran Piedra MD     RECORDS OBTAINED:  Records of the patients history including those obtained from the referring provider were reviewed and summarized in detail.    HISTORY OF PRESENT ILLNESS:  The patient is a 72 y.o. year old female who is here for initial evaluation with the above-mentioned history.    The patient had newly diagnosed mass in the pancreatic body for which she already has a scheduled appointment tomorrow for a CT-guided biopsy.  According to the patient, she had nausea that started sometime in this past 06/2017.  She was seen by her primary care physician, Dr. Piedra, and treated conservatively with medication.  In the meantime, she was also found to have an umbilical hernia and she was referred to the Surgeon, Dr. Haseeb Monterroso Jr, in late 06/2017.  Dr. Monterroso performed an umbilical hernia repair with mesh on 07/17/2017.  After surgery, the patient noticed there was no improvement of her nausea at all.  Subsequently, she had an ultrasound examination on 09/13/2017 at [facility].  This study reported a mass measuring 3.8 x 3.7 x 4.4 cm in the tail of the pancreas.  There was no dilatation of the common bile duct at 0.3 cm.  The liver appeared normal and the right kidney appeared normal.  The patient further had a CT scan of the abdomen with IV contrast obtained on 09/19/2017.  This study reported a pancreatic body mass measuring 4.5 cm x 3.4 cm, heterogenous, highly suspicious for primary pancreatic cancer.  There was no evidence of metastatic disease in the abdomen.  There was a small low-density in the anterior liver, possibly fatty infiltration.  Gallbladder, spleen, adrenal glands, and kidneys were all unremarkable except small bilateral kidney cysts.      The patient reports  she has had weight loss, about 5-6 pounds in the past 2 months, due to her nausea and decreased appetite.  She denies abdominal pain.  The patient otherwise is at baseline condition.      Outside laboratory study on September 12, 2017 reported a creatinine 0.67, glucose 114 otherwise unremarkable CMP including normal liver function panel, normal amylase and lipase.  WBC 6400, hemoglobin 12.9 and platelets 204,000.  Patient reports Zofran does not work well enough for her.  And Phenergan causes her too sleepy.      Past Medical History:   Diagnosis Date   • Abdominal pain    • Anxiety    • Degenerative spondylolisthesis, L4-5    • GERD (gastroesophageal reflux disease)    • Glaucoma    • Greater trochanteric bursitis    • High cholesterol    • History of chicken pox     Childhood   • History of diverticulosis    • History of mitral valve prolapse    • Infectious viral hepatitis 1976    A   • Irregular heart beat     ON MED SEVERAL YEARS   • Low back pain     GETTING EPIDURALS, DR CHAPA   • Osteoarthritis     Spine w/radiculopathy, lumbar region   • PONV (postoperative nausea and vomiting)    • Restless leg    • S/P epidural steroid injection    • Sleep apnea    • Sleep apnea with use of continuous positive airway pressure (CPAP)    • Umbilical hernia    History of mononucleosis   History of gastric ulcer     Past Surgical History:   Procedure Laterality Date   • APPENDECTOMY  1964   • BLADDER SURGERY  2012   • BUNIONECTOMY Bilateral    • CATARACT EXTRACTION Bilateral    • COLONOSCOPY     • GLAUCOMA SURGERY Bilateral    • HAMMER TOE REPAIR Right 2000   • HYSTERECTOMY  1992   • SEN'S NEUROMA EXCISION Left 2012   • PELVIC FLOOR REPAIR     • SHOULDER ARTHROSCOPY Right 09/25/2014    With rotator cuff repair 2 x 1.5 cm, subacromial decompression w/acromioplasty, debridement of labral tearing and biceps tenotomy and chondroplasty   • SKIN BIOPSY     • TONSILLECTOMY  1963   • UMBILICAL HERNIA REPAIR N/A 7/17/2017     Procedure: UMBILICAL HERNIA REPAIR;  Surgeon: Haseeb Monterroso Jr., MD;  Location: [facility] OR;  Service:        HEMATOLOGIC/ONCOLOGIC HISTORY:  (History from previous dates can be found in the separate document.)  See history of present illness    MEDICATIONS    Current Outpatient Prescriptions:   •  ALPRAZolam (XANAX) 0.5 MG tablet, Take 0.5 mg by mouth 3 (Three) Times a Day As Needed., Disp: , Rfl:   •  aspirin 81 MG EC tablet, Take 81 mg by mouth Daily. PT TO HOLD MED PRIOR TO OR, Disp: , Rfl:   •  Coenzyme Q10-Levocarnitine 100-20 MG capsule, Take 1 tablet by mouth Daily. PT TO HOLD MED PRIOR TO OR, Disp: , Rfl:   •  EPINEPHRINE IJ, Inject  as directed As Needed (pt has epi pen)., Disp: , Rfl:   •  Ezetimibe (ZETIA PO), Take 10 mg by mouth 2 (Two) Times a Day., Disp: , Rfl:   •  fluticasone (FLONASE) 50 MCG/ACT nasal spray, 1 spray into each nostril Every Evening., Disp: , Rfl:   •  Glucosamine-Chondroitin 250-200 MG capsule, Take 1 capsule by mouth Daily., Disp: , Rfl:   •  montelukast (SINGULAIR) 10 MG tablet, Take 10 mg by mouth Every Night., Disp: , Rfl:   •  Nutritional Supplements (JUICE PLUS FIBRE PO), Take 2 tablets by mouth Daily., Disp: , Rfl:   •  omeprazole (priLOSEC) 40 MG capsule, Take 40 mg by mouth 2 (Two) Times a Day., Disp: , Rfl:   •  ondansetron (ZOFRAN) 4 MG tablet, Take 1 tablet by mouth Every 6 (Six) Hours As Needed for Nausea or Vomiting for up to 20 doses., Disp: 20 tablet, Rfl: 0  •  Promethazine HCl (PHENERGAN PO), Take  by mouth. Dr hernandez prescribed, Disp: , Rfl:   •  rosuvastatin (CRESTOR) 5 MG tablet, Take 5 mg by mouth. MONDAYS, WEDNESDAYS AND FRIDAYS, Disp: , Rfl:   •  Unable to find, Take 1 each by mouth Daily. RELIEF FACTOR- 4 CAPSULES DAILY PT TO HOLD MED PRIOR TO OR, Disp: , Rfl:   •  verapamil SR (CALAN-SR) 180 MG CR tablet, Take 180 mg by mouth Every Night., Disp: , Rfl:   •  prochlorperazine (COMPAZINE) 10 MG tablet, Take 1 tablet by mouth Every 6 (Six) Hours As Needed for  Nausea or Vomiting for up to 60 doses., Disp: 60 tablet, Rfl: 5    ALLERGIES:     Allergies   Allergen Reactions   • Ceclor [Cefaclor] Itching and Swelling     THROAT SWELLING   • Alma Rosa Banda      DIFFICULTY BREATHING       SOCIAL HISTORY:       Social History     Social History   • Marital status:      Spouse name: Ellis   • Number of children: 2   • Years of education: College education      Occupational History   •   Retired     Social History Main Topics   • Smoking status: Former Smoker     Packs/day: 0.50     Years: 30.00     Types: Cigarettes   • Smokeless tobacco: Never Used      Comment: QUIT 22 YEARS AGO//25 yrs, 1 ppd   • Alcohol use 0.6 oz/week     1 Glasses of wine per week      Comment: per day   • Drug use: No   • Sexual activity: Defer         FAMILY HISTORY:  Family History   Problem Relation Age of Onset   • Heart disease Mother    • Hyperlipidemia Mother    • Stroke Mother    • Heart disease Father    • Heart disease Brother    • Arthritis Brother    • Hyperlipidemia Brother    • Stroke Brother    • Malig Hyperthermia Neg Hx    No family history of malignancy.    REVIEW OF SYSTEMS:  GENERAL: See history of present illness.   No fevers, chills, sweats.    SKIN: No nonhealing lesions. No rashes.  HEME/LYMPH: No easy bruising, bleeding.   No swollen nodes.   EYES: No vision changes or diplopia.   ENT: No tinnitus, hearing loss, gum bleeding, epistaxis, hoarseness or dysphagia.   RESPIRATORY: No cough, shortness of breath, hemoptysis or wheezing.   CVS: No chest pain, palpitations, orthopnea, dyspnea on exertion or PND.   GI: Has nausea and poor appetite, no vomiting, has chronic constipation.  No melena or hematochezia.   No abdominal pain.    : No lower tract obstructive symptoms, dysuria or hematuria.   MUSCULOSKELETAL: No bone pain.  No joint stiffness.   NEUROLOGICAL: No global weakness, loss of consciousness or seizures.   PSYCHIATRIC: No increased nervousness, mood  "changes or depression.     Objective    Vitals:    09/21/17 0858   BP: 104/63   Pulse: 85   Resp: 12   Temp: 97.8 °F (36.6 °C)   TempSrc: Oral   SpO2: 98%   Weight: 152 lb (68.9 kg)   Height: 68.11\" (173 cm)  Comment: new pt   PainSc: 0-No pain     Current Status 9/21/2017   ECOG score 0      PHYSICAL EXAM:    GENERAL:  Well-developed, well-nourished female in no acute distress.   SKIN:  Warm, dry without rashes, purpura or petechiae.  EYES:  Pupils equal, round and reactive to light.  EOMs intact.  Conjunctivae normal.  EARS:  Hearing intact.  NOSE:  No nasal discharge.  MOUTH:  Tongue is well-papillated; no stomatitis or ulcers.  Lips normal.  THROAT:  Oropharynx without lesions or exudates.  NECK:  Supple with good range of motion; no thyromegaly or masses, no JVD.  LYMPHATICS:  No cervical, supraclavicular, axillary or inguinal adenopathy.  CHEST:  Lungs clear to auscultation. Good airflow.  CARDIAC:  Regular rate and rhythm without murmurs, rubs or gallops. Normal S1,S2.  ABDOMEN:  Soft, nontender with no hepatosplenomegaly or masses.  Bowel sounds normal.   EXTREMITIES:  No clubbing, cyanosis or edema.  NEUROLOGICAL:  Cranial Nerves II-XII grossly intact.  No focal neurological deficits.  PSYCHIATRIC:  Normal affect and mood.    RECENT LABS:        WBC   Date Value Ref Range Status   07/06/2017 8.51 4.50 - 10.70 10*3/mm3 Final     Hemoglobin   Date Value Ref Range Status   07/06/2017 13.8 11.9 - 15.5 g/dL Final     Platelets   Date Value Ref Range Status   07/06/2017 254 140 - 500 10*3/mm3 Final     Lab Results   Component Value Date    GLUCOSE 114 (H) 07/06/2017    BUN 19 07/06/2017    CREATININE 0.70 09/19/2017    EGFRIFNONA 75 07/06/2017    BCR 25.0 07/06/2017    K 4.2 07/06/2017    CO2 20.9 (L) 07/06/2017    CALCIUM 9.8 07/06/2017         IMAGE STUDIES:   CT OF THE ABDOMEN WITH CONTRAST INCLUDING PANCREATIC PROTOCOL 09/19/2017      HISTORY: Evaluate pancreatic mass.      TECHNIQUE: Radiation dose reduction " techniques were utilized, including  automated exposure control and exposure modulation based on body size.  Following the intravenous contrast injection, axial images were obtained  through the pancreas and reconstructed in 2 mm intervals. Following the  pancreatic protocol, spiral images were obtained from the lung bases to  the iliac crest.      There is enlargement of the pancreatic body which contains an  approximately 4.5 cm x approximately 3.4 cm heterogeneous mass. The  pancreatic head appears relatively normal in size.      There is a tiny low-density area in the anterior liver near the  falciform ligament this is a common location for fatty infiltration. No  definite focal hepatic lesions are seen. The gallbladder, spleen,  adrenals and kidneys appear unremarkable except for small bilateral  renal cysts.      A 1.4 cm subcutaneous nodule is seen in the anterior rightward abdomen  on image 44 which may represent some type of sebaceous cyst.      IMPRESSION:  1. 3.4 cm x 4.5 cm pancreatic body mass is highly suspicious for  pancreatic neoplasm.  2. No definite evidence of metastatic disease.  3. Probable sebaceous type cyst in the subcutaneous tissues of the  anterior rightward abdomen.  Her      EXAMINATION: LIMITED ABDOMINAL SONOGRAM on 9/13/2017      HISTORY: 72-year-old female with a history of nausea.      TECHNIQUE: Sonographic evaluation of selected intra-abdominal structures  was performed.       FINDINGS: The right kidney and liver have a normal appearance. No  abnormality of the gallbladder is appreciated. Within the region of the  tail of the pancreas there is a 3.8 x 3.7 x 4.4 cm heterogenous  hypoechoic mass. The common bile duct measures 0.3 cm in diameter.      IMPRESSION:  A 4.4 cm mass in the region of the tail of the pancreas.  This is suspicious for a pancreatic neoplasm/carcinoma. Correlation with  a CT of the abdomen without and with contrast per pancreatic protocol  is  Recommended.        Assessment/Plan      1.  Pancreatic body mass, highly suspicious for primary pancreatic cancer.  I personally reviewed her CT scan images.  I discussed with the her for the proper workup and staging.  I encourage patient to keep up her appointment for CT-guided pancreatic biopsy tomorrow which is a Friday.  Expecting the pathology results will be available by next Tuesday.   I recommended to have [facility] Pet Skull Base To Mid Thigh for further staging (needs cytology confirmed malignancy to obtain approval for PET scan).  Discussed with the patient for 3 possible possibilities.  #1 the biopsy returned to be negative for malignancy or known diagnostic, then we'll will arrange patient to have repeated biopsy.  #2 confirmed to be primary pancreatic cancer, PET scan showed localized disease, if that's the case we will refer her to [facility] surgical oncology service for further evaluation of resectability.  #3 confirmed to be primary pancreatic cancer with metastatic disease in that case she will need palliative chemotherapy.     Her laboratory study for CA 19-9 is still pending, this is obtained at her primary care physician office on 9/19/2017.       2.  Nausea and weight loss.  This is because of her pancreatic mass.  Patient reports Zofran does not work well enough for her.  And Phenergan causes her too sleepy.  Discussed with patient, I'll recommended using Compazine 10 mg every 6 hours as needed.  I.e. scribed Compazine to her pharmacy.       PLAN:  1.  Keep appointment for CT-guided core needle biopsy of pancreatic body mass tomorrow on 9/22/2017.  2.  PET scan examination next week on Wednesday, 9/27/2017.   3.  Try Compazine as needed for nausea.  I e-scribed to her pharmacy.  4.  I will bring patient back in 8 days for reevaluation, expecting all results will be available.        RAMONA LATHAM M.D., Ph.D.    09/21/2017      CC:  MD Nando Ochoa disclaimer:  Much  of this encounter note is an electronic transcription/translation of spoken language to printed text. The electronic translation of spoken language may permit erroneous, or at times, nonsensical words or phrases to be inadvertently transcribed; Although I have reviewed the note for such errors, some may still exist.

## 2017-09-22 ENCOUNTER — HOSPITAL ENCOUNTER (OUTPATIENT)
Dept: CT IMAGING | Facility: HOSPITAL | Age: 72
Discharge: HOME | End: 2017-09-22
Attending: INTERNAL MEDICINE | Admitting: INTERNAL MEDICINE

## 2017-09-22 VITALS
TEMPERATURE: 97 F | HEART RATE: 71 BPM | DIASTOLIC BLOOD PRESSURE: 63 MMHG | HEIGHT: 68 IN | OXYGEN SATURATION: 97 % | SYSTOLIC BLOOD PRESSURE: 106 MMHG | WEIGHT: 152 LBS | RESPIRATION RATE: 18 BRPM | BODY MASS INDEX: 23.04 KG/M2

## 2017-09-22 DIAGNOSIS — K86.89 PANCREATIC MASS: ICD-10-CM

## 2017-09-22 LAB
INR PPP: 1.1 (ref 0.8–1.2)
PROTHROMBIN TIME: 13.5 SECONDS (ref 12.8–15.2)

## 2017-09-22 PROCEDURE — A9270 NON-COVERED ITEM OR SERVICE: HCPCS | Performed by: RADIOLOGY

## 2017-09-22 PROCEDURE — 88313 SPECIAL STAINS GROUP 2: CPT | Performed by: INTERNAL MEDICINE

## 2017-09-22 PROCEDURE — 77012 CT SCAN FOR NEEDLE BIOPSY: CPT

## 2017-09-22 PROCEDURE — 88342 IMHCHEM/IMCYTCHM 1ST ANTB: CPT | Performed by: INTERNAL MEDICINE

## 2017-09-22 PROCEDURE — 88307 TISSUE EXAM BY PATHOLOGIST: CPT | Performed by: INTERNAL MEDICINE

## 2017-09-22 PROCEDURE — 88341 IMHCHEM/IMCYTCHM EA ADD ANTB: CPT | Performed by: INTERNAL MEDICINE

## 2017-09-22 PROCEDURE — 63710000001 ALPRAZOLAM 1 MG TABLET: Performed by: RADIOLOGY

## 2017-09-22 RX ORDER — ALPRAZOLAM 1 MG/1
2 TABLET ORAL ONCE
Status: COMPLETED | OUTPATIENT
Start: 2017-09-22 | End: 2017-09-22

## 2017-09-22 RX ORDER — SODIUM CHLORIDE 0.9 % (FLUSH) 0.9 %
1-10 SYRINGE (ML) INJECTION AS NEEDED
Status: DISCONTINUED | OUTPATIENT
Start: 2017-09-22 | End: 2017-09-23 | Stop reason: HOSPADM

## 2017-09-22 RX ORDER — LIDOCAINE HYDROCHLORIDE 10 MG/ML
20 INJECTION, SOLUTION INFILTRATION; PERINEURAL ONCE
Status: COMPLETED | OUTPATIENT
Start: 2017-09-22 | End: 2017-09-22

## 2017-09-22 RX ADMIN — LIDOCAINE HYDROCHLORIDE 20 ML: 10 INJECTION, SOLUTION INFILTRATION; PERINEURAL at 09:53

## 2017-09-22 RX ADMIN — ALPRAZOLAM 2 MG: 1 TABLET ORAL at 09:05

## 2017-09-22 NOTE — H&P
Name: Elsie Burleson ADMIT: 2017   : 1945  PCP: Zoran Piedra MD    MRN: [id number] LOS: 0 days   AGE/SEX: 72 y.o. female  ROOM: Room/bed info not found       Chief complaint   Patient is a 72 y.o. female presents with pancreas mass.     Past Surgical History:  Past Surgical History:   Procedure Laterality Date   • APPENDECTOMY     • BLADDER SURGERY     • BUNIONECTOMY Bilateral    • CATARACT EXTRACTION Bilateral    • COLONOSCOPY     • GLAUCOMA SURGERY Bilateral    • HAMMER TOE REPAIR Right    • HYSTERECTOMY     • SEN'S NEUROMA EXCISION Left    • PELVIC FLOOR REPAIR     • SHOULDER ARTHROSCOPY Right 2014    With rotator cuff repair 2 x 1.5 cm, subacromial decompression w/acromioplasty, debridement of labral tearing and biceps tenotomy and chondroplasty   • SKIN BIOPSY     • TONSILLECTOMY     • UMBILICAL HERNIA REPAIR N/A 2017    Procedure: UMBILICAL HERNIA REPAIR;  Surgeon: Haseeb Monterroso Jr., MD;  Location: [facility];  Service:        Past Medical History:  Past Medical History:   Diagnosis Date   • Abdominal pain    • Anxiety    • Degenerative spondylolisthesis, L4-5    • GERD (gastroesophageal reflux disease)    • Glaucoma    • Greater trochanteric bursitis     left   • High cholesterol    • History of chicken pox     Childhood   • History of diverticulosis    • History of mitral valve prolapse    • Infectious viral hepatitis     A   • Irregular heart beat     ON MED SEVERAL YEARS   • Low back pain     GETTING EPIDURALS, DR CHAPA   • Osteoarthritis     Spine w/radiculopathy, lumbar region   • PONV (postoperative nausea and vomiting)    • Restless leg    • S/P epidural steroid injection    • Sleep apnea with use of continuous positive airway pressure (CPAP)    • Umbilical hernia        Home Medications:    (Not in a hospital admission)    Allergies:  Bee venom; Ceclor [cefaclor]; and Banda    Family History:  Family History   Problem  Relation Age of Onset   • Heart disease Mother    • Hyperlipidemia Mother    • Stroke Mother    • Heart disease Father    • Heart disease Brother    • Arthritis Brother    • Hyperlipidemia Brother    • Stroke Brother    • Malig Hyperthermia Neg Hx        Social History:  Social History   Substance Use Topics   • Smoking status: Former Smoker     Packs/day: 0.50     Years: 30.00     Types: Cigarettes   • Smokeless tobacco: Never Used      Comment: QUIT 22 YEARS AGO//25 yrs, 1 ppd   • Alcohol use 0.6 oz/week     1 Glasses of wine per week      Comment: per day        Objective     Physical Exam:   Pancreas mass    Vital Signs  Temp:  [97 °F (36.1 °C)] 97 °F (36.1 °C)  Heart Rate:  [72] 72  Resp:  [18] 18  BP: (126)/(67) 126/67    Anticipated Surgical Procedure:  Pancreas biopsy    The risks, benefits and alternatives of this procedure have been discussed with the patient or responsible party: Yes        Dereje Kim MD  09/22/17  9:19 AM

## 2017-09-22 NOTE — DISCHARGE INSTRUCTIONS
EDUCATION /DISCHARGE INSTRUCTIONS  CT/US guided biopsy:  A biopsy is a procedure done to remove tissue for further analysis.  Before images are taken to locate the target area.  Images can be obtained using ultrasound, CT or MRI.  A physician will clean your skin with antiseptic soap, place a sterile towel around the site and administer a local anesthetic to numb the area.  The physician will then insert a special needle.  Sometimes images are taken of the needle after it is inserted to ensure the needle is in the correct area to be biopsied.   A sample is obtained and sent to the laboratory for study.  Occasionally the laboratory is unable to make a diagnosis from the sample and the procedure may need to be repeated.  Within a week the radiologist will send a report to your physician.  A pathologist will also examine the tissue and send a report.      Risks of the procedure include but are not limited to:   *  Bleeding    *  Infection   *  Puncture of surrounding organs *  Death     *  Lung collapse if the biopsy is near the chest which may require insertion of a       tube to re-inflate the lung if severe.      Benefits of the procedure:  Using x-ray helps to locate the area that requires a biopsy. The procedure is less invasive than a surgical procedure, there are no large incisions and it does not require anesthesia.      Alternatives to the procedure:  A biopsy can be performed surgically.  Risks of a surgical biopsy include exposure to anesthesia, infection, excessive bleeding and injury to abdominal organs.  A benefit of surgical biopsy is the ability to see the area to be biopsied and remove of a larger piece of tissue.    THIS EDUCATION INFORMATION WAS REVIEWED PRIOR TO PROCEDURE AND CONSENT. Patient initials__________________Time___________________    Post Procedure:    *  Expect the biopsy site may be tender up to one week.    *  Rest today (no pushing pulling or straining).   *  Slowly increase activity  tomorrow.    *  If you received sedation do not drive for 24 hours.   *  Keep dressing clean and dry.   *  Leave dressing on puncture site for 24 hours.    *  You may shower when dressing removed.    Call your doctor if experiencing:   *  Signs of infection such as redness, swelling, excessive pain and / or foul        smelling drainage from the puncture site.   *  Chills or fever over 101 degrees (by mouth).   *  Unrelieved pain.   *  Any new or severe symptoms.   *  If experiencing sudden / severe shortness of breath or chest pain go to the       nearest emergency room.     Following the procedure:     Follow-up with the ordering physician as directed.    Continue to take other medications as directed by your physician unless    otherwise instructed.   If applicable, resume taking your blood thinners or Aspirin on ___________.   If applicable, resume taking Glucophage on ___________.    If you have any concerns please call the Radiology Nurses Desk at [phone].  You are the most important factor in your recovery.  Follow the above instructions carefully.

## 2017-09-23 ENCOUNTER — TELEPHONE (OUTPATIENT)
Dept: INTERVENTIONAL RADIOLOGY/VASCULAR | Facility: HOSPITAL | Age: 72
End: 2017-09-23

## 2017-09-25 ENCOUNTER — APPOINTMENT (OUTPATIENT)
Dept: PET IMAGING | Facility: HOSPITAL | Age: 72
End: 2017-09-25
Attending: INTERNAL MEDICINE

## 2017-09-25 ENCOUNTER — TELEPHONE (OUTPATIENT)
Dept: ONCOLOGY | Facility: HOSPITAL | Age: 72
End: 2017-09-25

## 2017-09-25 LAB
CYTO UR: NORMAL
LAB AP CASE REPORT: NORMAL
Lab: NORMAL
PATH REPORT.FINAL DX SPEC: NORMAL
PATH REPORT.GROSS SPEC: NORMAL

## 2017-09-25 RX ORDER — DEXAMETHASONE 4 MG/1
4 TABLET ORAL
Qty: 30 TABLET | Refills: 1 | Status: ON HOLD | OUTPATIENT
Start: 2017-09-25 | End: 2017-11-16

## 2017-09-25 NOTE — TELEPHONE ENCOUNTER
Received VM from pt stating the compazine is not helping her nausea. Dr. Lopez's note states zofran did not help and phenergan makes her too sleepy. Reviewed with Dr. Lopez. Order for Dex 4mg once a day prescribed to pt's pharmacy. Informed pt. Pt v/u

## 2017-09-26 ENCOUNTER — TELEPHONE (OUTPATIENT)
Dept: ONCOLOGY | Facility: HOSPITAL | Age: 72
End: 2017-09-26

## 2017-09-26 DIAGNOSIS — K86.89 PANCREATIC MASS: Primary | ICD-10-CM

## 2017-09-26 NOTE — TELEPHONE ENCOUNTER
Patient calling to see if she needs to go to PET scan or not or if she is getting another biopsy. Per , do not go to PET, wouldn't be covered by insurance, and new order placed for second biopsy and appnt desk to call her to set up/

## 2017-09-27 ENCOUNTER — HOSPITAL ENCOUNTER (OUTPATIENT)
Dept: PET IMAGING | Facility: HOSPITAL | Age: 72
End: 2017-09-27
Attending: INTERNAL MEDICINE

## 2017-09-27 ENCOUNTER — APPOINTMENT (OUTPATIENT)
Dept: PET IMAGING | Facility: HOSPITAL | Age: 72
End: 2017-09-27
Attending: INTERNAL MEDICINE

## 2017-09-29 ENCOUNTER — TELEPHONE (OUTPATIENT)
Dept: INTERVENTIONAL RADIOLOGY/VASCULAR | Facility: HOSPITAL | Age: 72
End: 2017-09-29

## 2017-09-29 ENCOUNTER — APPOINTMENT (OUTPATIENT)
Dept: ONCOLOGY | Facility: CLINIC | Age: 72
End: 2017-09-29

## 2017-09-29 ENCOUNTER — DOCUMENTATION (OUTPATIENT)
Dept: ONCOLOGY | Facility: HOSPITAL | Age: 72
End: 2017-09-29

## 2017-09-29 ENCOUNTER — HOSPITAL ENCOUNTER (OUTPATIENT)
Dept: CT IMAGING | Facility: HOSPITAL | Age: 72
Discharge: HOME | End: 2017-09-29
Attending: INTERNAL MEDICINE

## 2017-09-29 ENCOUNTER — APPOINTMENT (OUTPATIENT)
Dept: OTHER | Facility: HOSPITAL | Age: 72
End: 2017-09-29

## 2017-10-02 ENCOUNTER — TELEPHONE (OUTPATIENT)
Dept: ONCOLOGY | Facility: HOSPITAL | Age: 72
End: 2017-10-02

## 2017-10-02 NOTE — TELEPHONE ENCOUNTER
Dr Piedra's office calling because patient had not heard from Dr Lopez. Re-explained to them, I have left Dr Lopez a message. He is gone for the day and is back tomorrow morning.

## 2017-10-02 NOTE — TELEPHONE ENCOUNTER
Patient calling for more information on cancelled biopsy. Pt states she was expecting a call from Dr Lopez. Inbasket message sent to Dr Lopez and called patient to let her know. No answer. LVM.

## 2017-10-02 NOTE — TELEPHONE ENCOUNTER
Pt calling back to see if Dr Lopez was going to call her. Informed pt I had left him a message but he was only here for half a day today. He is back tomorrow. Pt v/u

## 2017-10-03 ENCOUNTER — DOCUMENTATION (OUTPATIENT)
Dept: ONCOLOGY | Facility: CLINIC | Age: 72
End: 2017-10-03

## 2017-10-03 DIAGNOSIS — K86.89 PANCREATIC MASS: Primary | ICD-10-CM

## 2017-10-03 NOTE — PROGRESS NOTES
"OSW initiated a call to the pt. She had been seen by Dr. Lopez at [facility] on 9/21/17 for a pancreatic mass. She completed the Distress Questionnaire and scored 5/10, marking anxiety, fears, and several physical concerns.    The pt presented as very friendly and gracious. She stated that her biospy was cancelled as it was considered \"too risky\" by the physician. She is scheduled to see Dr. Maravilla with surgical oncology, for a consultation on 10/12. She cited strong praneeth and stated that she is very relieved to have a plan.    OSW will call pt the week of October 16th to receive the pt's updated medical results, plan, and to address any needs that she may have at that time.    Kristine Yadav, [facility]  Oncology Social Worker  "

## 2017-10-06 DIAGNOSIS — K86.89 PANCREATIC MASS: Primary | ICD-10-CM

## 2017-10-18 ENCOUNTER — TELEPHONE (OUTPATIENT)
Dept: ONCOLOGY | Facility: CLINIC | Age: 72
End: 2017-10-18

## 2017-10-18 ENCOUNTER — TRANSCRIBE ORDERS (OUTPATIENT)
Dept: ADMINISTRATIVE | Facility: HOSPITAL | Age: 72
End: 2017-10-18

## 2017-10-18 ENCOUNTER — TELEPHONE (OUTPATIENT)
Dept: ONCOLOGY | Facility: HOSPITAL | Age: 72
End: 2017-10-18

## 2017-10-18 DIAGNOSIS — R11.0 NAUSEA: Primary | ICD-10-CM

## 2017-10-18 DIAGNOSIS — K86.89 PANCREATIC MASS: Primary | ICD-10-CM

## 2017-10-18 DIAGNOSIS — D37.9 NEOPLASM OF UNCERTAIN BEHAVIOR OF DIGESTIVE ORGAN: ICD-10-CM

## 2017-10-18 NOTE — TELEPHONE ENCOUNTER
Patient was recently seen last week by Dr. Maravilla at the [facility] for her pancreatic mass.  Dr. Maravilla recommended clinical follow-up.  Patient's primary care physician Dr. Piedra called me today, and we reviewed the patient's case.  We both concured that the patient will need a short interval CT scan for follow-up and reevaluation of the pancreas mass because of concerning for possible malignancy.  We'll set up a CT scan for abdomen with pancreatic protocol in 2 months together with laboratory study including tumor marker CA 19-9.       RAMONA LATHAM M.D., Ph.D.    10/18/2017

## 2017-10-18 NOTE — TELEPHONE ENCOUNTER
----- Message from Dana Chi RN sent at 10/18/2017  4:19 PM EDT -----  Please set this up! THanks!       I spoke with her primary care physician Dr. Piedra today, we will obtain an repeated CT scan of the abdomen with pancreatic protocol in 2 months for reevaluation.  Also obtain laboratory study CBC CMP and CA 19-9 on day of her CT scan.  Please call patient and scheduled this study.     Thanks!     Dr. Lopez

## 2017-10-18 NOTE — TELEPHONE ENCOUNTER
Message sent to scheduling to set up scans and labs    ----- Message from Enmanuel Lopez MD PhD sent at 10/18/2017  2:01 PM EDT -----  Regarding: CT scan 2 months   I spoke with her primary care physician Dr. Piedra today, we will obtain an repeated CT scan of the abdomen with pancreatic protocol in 2 months for reevaluation.  Also obtain laboratory study CBC CMP and CA 19-9 on day of her CT scan.  Please call patient and scheduled this study.     Thanks!    Dr. Lopez

## 2017-10-31 ENCOUNTER — OFFICE VISIT (OUTPATIENT)
Dept: GASTROENTEROLOGY | Facility: CLINIC | Age: 72
End: 2017-10-31

## 2017-10-31 VITALS
SYSTOLIC BLOOD PRESSURE: 116 MMHG | BODY MASS INDEX: 24.46 KG/M2 | HEIGHT: 68 IN | WEIGHT: 161.4 LBS | DIASTOLIC BLOOD PRESSURE: 70 MMHG | TEMPERATURE: 97.7 F

## 2017-10-31 DIAGNOSIS — K86.2 PANCREATIC CYST: Primary | ICD-10-CM

## 2017-10-31 PROCEDURE — 99204 OFFICE O/P NEW MOD 45 MIN: CPT | Performed by: INTERNAL MEDICINE

## 2017-10-31 RX ORDER — LORAZEPAM 0.5 MG/1
0.5 TABLET ORAL EVERY 8 HOURS PRN
COMMUNITY
End: 2019-06-25

## 2017-10-31 NOTE — PROGRESS NOTES
Chief Complaint   Patient presents with   • Nausea   • Heartburn     Elsie Burleson is a 72 y.o. female who presents with a History of pancreatic lesion   HPI     Patient's 32-year-old female with history of GERD osteoarthritis and elevated cholesterol who was complaining of upper GI symptoms.  Patient with recurrent nausea and vomiting having difficulty with control underwent ultrasound.  Ultrasound showed normal gallbladder but suggested pancreatic mass.  CT of the abdomen with pancreatic protocol revealed a cystic irregular looking lesion and patient underwent pancreatic biopsies.  Biopsies revealed microcystic cystadenoma.  Oncology and recommended repeat biopsy but due to difficulty in accessing the lesion radiology felt it was too high risk.  Patient was seen by Dr. Maravilla in surgical oncology recommended removal of the tumor would also be high risk and likely result in untreatable diabetes.  Patient now here for further recommendations to assess the nature and risk of this lesion.  Patient currently feels improved having gone on twice a day omeprazole 40 mg.  Patient has noted some weight loss but no change in bowel habits nausea and vomiting have improved.  Patient with no fever or chills, no abdominal pain.    Past Medical History:   Diagnosis Date   • Abdominal pain    • Anxiety    • Degenerative spondylolisthesis, L4-5    • GERD (gastroesophageal reflux disease)    • Glaucoma    • Greater trochanteric bursitis     left   • High cholesterol    • History of chicken pox     Childhood   • History of diverticulosis    • History of foreign travel 02/2017    [place]    • History of mitral valve prolapse    • Infectious viral hepatitis 1976    A   • Irregular heart beat     ON MED SEVERAL YEARS   • Low back pain     GETTING EPIDURALS, DR CHAPA   • Osteoarthritis     Spine w/radiculopathy, lumbar region   • PONV (postoperative nausea and vomiting)    • Restless leg    • S/P epidural steroid injection    •  Sleep apnea with use of continuous positive airway pressure (CPAP)    • Umbilical hernia        Current Outpatient Prescriptions:   •  Coenzyme Q10-Levocarnitine 100-20 MG capsule, Take 1 tablet by mouth Daily. PT TO HOLD MED PRIOR TO OR, Disp: , Rfl:   •  Ezetimibe (ZETIA PO), Take 10 mg by mouth 2 (Two) Times a Day., Disp: , Rfl:   •  fluticasone (FLONASE) 50 MCG/ACT nasal spray, 1 spray into each nostril Every Evening., Disp: , Rfl:   •  Glucosamine-Chondroitin 250-200 MG capsule, Take 1 capsule by mouth Daily., Disp: , Rfl:   •  LORazepam (ATIVAN) 0.5 MG tablet, Take 0.5 mg by mouth Every 8 (Eight) Hours As Needed for Anxiety., Disp: , Rfl:   •  montelukast (SINGULAIR) 10 MG tablet, Take 10 mg by mouth Every Night., Disp: , Rfl:   •  Nutritional Supplements (JUICE PLUS FIBRE PO), Take 2 tablets by mouth Daily., Disp: , Rfl:   •  omeprazole (priLOSEC) 40 MG capsule, Take 40 mg by mouth 2 (Two) Times a Day., Disp: , Rfl:   •  ondansetron (ZOFRAN) 4 MG tablet, Take 1 tablet by mouth Every 6 (Six) Hours As Needed for Nausea or Vomiting for up to 20 doses., Disp: 20 tablet, Rfl: 0  •  rosuvastatin (CRESTOR) 5 MG tablet, Take 5 mg by mouth. MONDAYS, WEDNESDAYS AND FRIDAYS, Disp: , Rfl:   •  Unable to find, Take 1 each by mouth Daily. RELIEF FACTOR- 4 CAPSULES DAILY PT TO HOLD MED PRIOR TO OR, Disp: , Rfl:   •  verapamil SR (CALAN-SR) 180 MG CR tablet, Take 180 mg by mouth Every Night., Disp: , Rfl:   •  ALPRAZolam (XANAX) 0.5 MG tablet, Take 0.5 mg by mouth 3 (Three) Times a Day As Needed., Disp: , Rfl:   •  aspirin 81 MG EC tablet, Take 81 mg by mouth Daily. PT TO HOLD MED PRIOR TO OR, Disp: , Rfl:   •  dexamethasone (DECADRON) 4 MG tablet, Take 1 tablet by mouth Daily With Breakfast., Disp: 30 tablet, Rfl: 1  •  EPINEPHRINE IJ, Inject  as directed As Needed (pt has epi pen)., Disp: , Rfl:   •  Pancrelipase, Lip-Prot-Amyl, (CREON) 86398 units capsule delayed-release particles, Take 1 capsule by mouth 4 (Four) Times a  Day. With Meals and snack, Disp: 120 capsule, Rfl: 11  •  prochlorperazine (COMPAZINE) 10 MG tablet, Take 1 tablet by mouth Every 6 (Six) Hours As Needed for Nausea or Vomiting for up to 60 doses., Disp: 60 tablet, Rfl: 5  •  Promethazine HCl (PHENERGAN PO), Take  by mouth. Dr hernandez prescribed, Disp: , Rfl:   Allergies   Allergen Reactions   • Bee Venom Hives   • Ceclor [Cefaclor] Itching and Swelling     THROAT SWELLING   • Cherry      DIFFICULTY BREATHING     Social History     Social History   • Marital status:      Spouse name: Ellis   • Number of children: 2   • Years of education: College     Occupational History   •  Retired     Social History Main Topics   • Smoking status: Former Smoker     Packs/day: 0.50     Years: 30.00     Types: Cigarettes   • Smokeless tobacco: Never Used      Comment: QUIT 22 YEARS AGO//25 yrs, 1 ppd   • Alcohol use 0.6 oz/week     1 Glasses of wine per week      Comment: per day   • Drug use: No   • Sexual activity: Defer     Other Topics Concern   • Not on file     Social History Narrative     Family History   Problem Relation Age of Onset   • Heart disease Mother    • Hyperlipidemia Mother    • Stroke Mother    • Hypertension Mother    • Heart disease Father    • Heart disease Brother    • Arthritis Brother    • Hyperlipidemia Brother    • Stroke Brother    • Hypertension Brother    • Malig Hyperthermia Neg Hx      Review of Systems   Constitutional: Positive for unexpected weight change. Negative for activity change, appetite change, chills, diaphoresis, fatigue and fever.   HENT: Negative.    Eyes: Negative.    Respiratory: Negative.    Cardiovascular: Negative.    Gastrointestinal: Positive for nausea. Negative for abdominal distention, abdominal pain, blood in stool, constipation, diarrhea, rectal pain and vomiting.   Endocrine: Negative.    Musculoskeletal: Negative.    Skin: Negative.    Hematological: Negative.      Vitals:    10/31/17 1545   BP: 116/70   Temp: 97.7  °F (36.5 °C)     Physical Exam   Constitutional: She is oriented to person, place, and time. She appears well-developed and well-nourished.   HENT:   Head: Normocephalic and atraumatic.   Eyes: Pupils are equal, round, and reactive to light. No scleral icterus.   Cardiovascular: Normal rate, regular rhythm and normal heart sounds.  Exam reveals no gallop and no friction rub.    No murmur heard.  Pulmonary/Chest: Effort normal and breath sounds normal. She has no wheezes. She has no rales.   Abdominal: Soft. Bowel sounds are normal. She exhibits no shifting dullness, no distension, no pulsatile liver, no fluid wave, no abdominal bruit, no ascites, no pulsatile midline mass and no mass. There is no hepatosplenomegaly. There is no tenderness. There is no rigidity and no guarding. No hernia.   Musculoskeletal: Normal range of motion. She exhibits no edema.   Neurological: She is alert and oriented to person, place, and time. No cranial nerve deficit.   Skin: Skin is warm and dry. No rash noted.   Psychiatric: She has a normal mood and affect. Her behavior is normal.   Nursing note and vitals reviewed.    Diagnoses and all orders for this visit:    Pancreatic cyst    Other orders  -     LORazepam (ATIVAN) 0.5 MG tablet; Take 0.5 mg by mouth Every 8 (Eight) Hours As Needed for Anxiety.  -     Pancrelipase, Lip-Prot-Amyl, (CREON) 30736 units capsule delayed-release particles; Take 1 capsule by mouth 4 (Four) Times a Day. With Meals and snack    Patient 72-year-old female with history of GERD, osteoarthritis, knee irregular heart beat presenting for evaluation.  Patient with symptoms of nausea and vomiting found on ultrasound with a pancreatic mass.  Patient underwent CT and CT-guided biopsy with microcystic cystadenoma.  Patient was to undergo repeat biopsy but radiology refused due to high risk of access percutaneously.  Patient saw Dr. Maravilla and per patient felt that this was a benign lesion and should not be removed  as it was most likely resulting in diabetes.  Patient however and her care team including oncology and primary physician are concerned with the possibility there may be a malignancy involved.  Patient now referred for further recommendations.  At this point will discuss possibility of endoscopic ultrasound-guided biopsies.  It also recommend endoscopic evaluation of the same time as a lot of the symptoms may result in pancreatic insufficiency particularly bicarbonate flow reduction causing peptic ulcer disease in the duodenum.  Patient was already found some improvement taking 40 mg twice a day of omeprazole.  We'll also add Creon 36,000 units with each meal and snack and follow clinical response.

## 2017-11-01 ENCOUNTER — HOSPITAL ENCOUNTER (OUTPATIENT)
Dept: NUCLEAR MEDICINE | Facility: HOSPITAL | Age: 72
Discharge: HOME | End: 2017-11-01
Attending: INTERNAL MEDICINE

## 2017-11-01 DIAGNOSIS — R11.0 NAUSEA: ICD-10-CM

## 2017-11-01 PROCEDURE — 78227 HEPATOBIL SYST IMAGE W/DRUG: CPT

## 2017-11-01 PROCEDURE — 0 TECHNETIUM TC 99M MEBROFENIN KIT: Performed by: INTERNAL MEDICINE

## 2017-11-01 PROCEDURE — 25010000002 SINCALIDE PER 5 MCG: Performed by: INTERNAL MEDICINE

## 2017-11-01 PROCEDURE — A9537 TC99M MEBROFENIN: HCPCS | Performed by: INTERNAL MEDICINE

## 2017-11-01 RX ORDER — KIT FOR THE PREPARATION OF TECHNETIUM TC 99M MEBROFENIN 45 MG/10ML
1 INJECTION, POWDER, LYOPHILIZED, FOR SOLUTION INTRAVENOUS
Status: COMPLETED | OUTPATIENT
Start: 2017-11-01 | End: 2017-11-01

## 2017-11-01 RX ADMIN — SINCALIDE 1.5 MCG: 5 INJECTION, POWDER, LYOPHILIZED, FOR SOLUTION INTRAVENOUS at 09:30

## 2017-11-01 RX ADMIN — MEBROFENIN 1 DOSE: 45 INJECTION, POWDER, LYOPHILIZED, FOR SOLUTION INTRAVENOUS at 08:00

## 2017-11-06 ENCOUNTER — PREP FOR SURGERY (OUTPATIENT)
Dept: OTHER | Facility: HOSPITAL | Age: 72
End: 2017-11-06

## 2017-11-06 ENCOUNTER — TELEPHONE (OUTPATIENT)
Dept: GASTROENTEROLOGY | Facility: CLINIC | Age: 72
End: 2017-11-06

## 2017-11-06 DIAGNOSIS — K86.89 PANCREATIC MASS: Primary | ICD-10-CM

## 2017-11-06 NOTE — TELEPHONE ENCOUNTER
----- Message from Jethro Pitts MD sent at 11/6/2017 12:14 PM EST -----  Regarding: EUS  Please schedule for EUS with me on 11/16 if ok with patient. Thanks.

## 2017-11-16 ENCOUNTER — ANESTHESIA EVENT (OUTPATIENT)
Dept: GASTROENTEROLOGY | Facility: HOSPITAL | Age: 72
End: 2017-11-16

## 2017-11-16 ENCOUNTER — ANESTHESIA (OUTPATIENT)
Dept: GASTROENTEROLOGY | Facility: HOSPITAL | Age: 72
End: 2017-11-16

## 2017-11-16 ENCOUNTER — HOSPITAL ENCOUNTER (OUTPATIENT)
Facility: HOSPITAL | Age: 72
Setting detail: HOSPITAL OUTPATIENT SURGERY
Discharge: HOME | End: 2017-11-16
Attending: INTERNAL MEDICINE | Admitting: INTERNAL MEDICINE

## 2017-11-16 VITALS
RESPIRATION RATE: 14 BRPM | WEIGHT: 157 LBS | DIASTOLIC BLOOD PRESSURE: 67 MMHG | BODY MASS INDEX: 23.79 KG/M2 | SYSTOLIC BLOOD PRESSURE: 127 MMHG | OXYGEN SATURATION: 100 % | HEIGHT: 68 IN | TEMPERATURE: 98.1 F | HEART RATE: 66 BPM

## 2017-11-16 DIAGNOSIS — K86.89 PANCREATIC MASS: ICD-10-CM

## 2017-11-16 PROCEDURE — 88305 TISSUE EXAM BY PATHOLOGIST: CPT | Performed by: INTERNAL MEDICINE

## 2017-11-16 PROCEDURE — 43239 EGD BIOPSY SINGLE/MULTIPLE: CPT | Performed by: INTERNAL MEDICINE

## 2017-11-16 PROCEDURE — 25010000002 PROPOFOL 10 MG/ML EMULSION: Performed by: ANESTHESIOLOGY

## 2017-11-16 PROCEDURE — 43238 EGD US FINE NEEDLE BX/ASPIR: CPT | Performed by: INTERNAL MEDICINE

## 2017-11-16 PROCEDURE — 88312 SPECIAL STAINS GROUP 1: CPT | Performed by: INTERNAL MEDICINE

## 2017-11-16 PROCEDURE — 88173 CYTOPATH EVAL FNA REPORT: CPT | Performed by: INTERNAL MEDICINE

## 2017-11-16 PROCEDURE — 25010000002 LEVOFLOXACIN PER 250 MG: Performed by: INTERNAL MEDICINE

## 2017-11-16 RX ORDER — LEVOFLOXACIN 5 MG/ML
500 INJECTION, SOLUTION INTRAVENOUS ONCE
Status: COMPLETED | OUTPATIENT
Start: 2017-11-16 | End: 2017-11-16

## 2017-11-16 RX ORDER — LIDOCAINE HYDROCHLORIDE 20 MG/ML
INJECTION, SOLUTION INFILTRATION; PERINEURAL AS NEEDED
Status: DISCONTINUED | OUTPATIENT
Start: 2017-11-16 | End: 2017-11-16 | Stop reason: SURG

## 2017-11-16 RX ORDER — PROPOFOL 10 MG/ML
VIAL (ML) INTRAVENOUS AS NEEDED
Status: DISCONTINUED | OUTPATIENT
Start: 2017-11-16 | End: 2017-11-16 | Stop reason: SURG

## 2017-11-16 RX ORDER — SODIUM CHLORIDE, SODIUM LACTATE, POTASSIUM CHLORIDE, CALCIUM CHLORIDE 600; 310; 30; 20 MG/100ML; MG/100ML; MG/100ML; MG/100ML
30 INJECTION, SOLUTION INTRAVENOUS CONTINUOUS PRN
Status: DISCONTINUED | OUTPATIENT
Start: 2017-11-16 | End: 2017-11-16 | Stop reason: HOSPADM

## 2017-11-16 RX ADMIN — SODIUM CHLORIDE, POTASSIUM CHLORIDE, SODIUM LACTATE AND CALCIUM CHLORIDE 30 ML/HR: 600; 310; 30; 20 INJECTION, SOLUTION INTRAVENOUS at 10:41

## 2017-11-16 RX ADMIN — METRONIDAZOLE 500 MG: 500 INJECTION, SOLUTION INTRAVENOUS at 13:30

## 2017-11-16 RX ADMIN — LEVOFLOXACIN 500 MG: 5 INJECTION, SOLUTION INTRAVENOUS at 12:20

## 2017-11-16 RX ADMIN — LIDOCAINE HYDROCHLORIDE 100 MG: 20 INJECTION, SOLUTION INFILTRATION; PERINEURAL at 11:51

## 2017-11-16 RX ADMIN — PROPOFOL 200 MG: 10 INJECTION, EMULSION INTRAVENOUS at 11:51

## 2017-11-16 RX ADMIN — PROPOFOL 200 MG: 10 INJECTION, EMULSION INTRAVENOUS at 12:20

## 2017-11-16 NOTE — PLAN OF CARE
Problem: Patient Care Overview (Adult)  Goal: Plan of Care Review  Outcome: Ongoing (interventions implemented as appropriate)    11/16/17 1006   Coping/Psychosocial Response Interventions   Plan Of Care Reviewed With patient   Patient Care Overview   Progress no change       Goal: Adult Individualization and Mutuality  Outcome: Ongoing (interventions implemented as appropriate)  Goal: Discharge Needs Assessment  Outcome: Ongoing (interventions implemented as appropriate)    11/16/17 1006   Discharge Needs Assessment   Concerns To Be Addressed basic needs concerns   Discharge Disposition home or self-care   Living Environment   Transportation Available car         Problem: GI Endoscopy (Adult)  Goal: Signs and Symptoms of Listed Potential Problems Will be Absent or Manageable (GI Endoscopy)  Outcome: Ongoing (interventions implemented as appropriate)    11/16/17 1006   GI Endoscopy   Problems Assessed (GI Endoscopy) pain   Problems Present (GI Endoscopy) none

## 2017-11-16 NOTE — ANESTHESIA POSTPROCEDURE EVALUATION
"Patient: Elsie Burleson    Procedure Summary     Date Anesthesia Start Anesthesia Stop Room / Location    11/16/17 1149 1236  [facility]       Procedure Diagnosis Surgeon Provider    ENDOSCOPIC ULTRASOUND (UPPER) WITH BIOPSY AND FNA PANCREATIC MASS (N/A ) Pancreatic mass  (Pancreatic mass [K86.9]) MD Annetta Sharif MD          Anesthesia Type: MAC  Last vitals  BP   127/67 (11/16/17 1331)   Temp   36.7 °C (98.1 °F) (11/16/17 1028)   Pulse   66 (11/16/17 1256)   Resp   14 (11/16/17 1256)     SpO2   100 % (11/16/17 1331)     Post Anesthesia Care and Evaluation    Patient location during evaluation: PACU  Patient participation: complete - patient participated  Level of consciousness: awake and alert  Pain management: adequate  Airway patency: patent  Anesthetic complications: No anesthetic complications    Cardiovascular status: acceptable  Respiratory status: acceptable  Hydration status: acceptable    Comments: /67  Pulse 66  Temp 36.7 °C (98.1 °F) (Oral)   Resp 14  Ht 68\" (172.7 cm)  Wt 157 lb (71.2 kg)  SpO2 100%  BMI 23.87 kg/m2      "

## 2017-11-16 NOTE — ANESTHESIA PREPROCEDURE EVALUATION
Anesthesia Evaluation     Patient summary reviewed and Nursing notes reviewed   history of anesthetic complications:  NPO Solid Status: > 8 hours  NPO Liquid Status: > 8 hours     Airway   Mallampati: I  TM distance: >3 FB  Neck ROM: full  no difficulty expected  Dental - normal exam     Pulmonary - normal exam   (+) sleep apnea on CPAP,   Cardiovascular - normal exam    ECG reviewed    (+) hyperlipidemia      Neuro/Psych  (+) psychiatric history Anxiety,    GI/Hepatic/Renal/Endo    (+)  GERD, liver disease,     ROS Comment: Pancreatic mass      Musculoskeletal     Abdominal  - normal exam    Bowel sounds: normal.   Substance History      OB/GYN          Other                                        Anesthesia Plan    ASA 3     MAC     Anesthetic plan and risks discussed with patient.

## 2017-11-21 ENCOUNTER — TELEPHONE (OUTPATIENT)
Dept: GASTROENTEROLOGY | Facility: CLINIC | Age: 72
End: 2017-11-21

## 2017-11-22 NOTE — PROGRESS NOTES
FNA was non-diagnostic, but certainly showed no signs of malignancy.    We are awaiting more information from the send out labs.

## 2017-11-27 NOTE — H&P
[facility]  Pre Procedure History & Physical    Chief Complaint:   Pancreatic mass    Subjective     HPI:   Patient's 72-year-old female with history of GERD osteoarthritis and elevated cholesterol who was complaining of upper GI symptoms.  Patient with recurrent nausea and vomiting having difficulty with control underwent ultrasound.  Ultrasound showed normal gallbladder but suggested pancreatic mass.  CT of the abdomen with pancreatic protocol revealed a cystic irregular looking lesion and patient underwent pancreatic biopsies.  Biopsies revealed microcystic cystadenoma.  Oncology and recommended repeat biopsy but due to difficulty in accessing the lesion radiology felt it was too high risk.  Patient was seen by Dr. Maravilla in surgical oncology recommended removal of the tumor would also be high risk and likely result in untreatable diabetes.  Patient now here for further recommendations to assess the nature and risk of this lesion.  Patient currently feels improved having gone on twice a day omeprazole 40 mg.  Patient has noted some weight loss but no change in bowel habits nausea and vomiting have improved.  Patient with no fever or chills, no abdominal pain.    Past Medical History:   Past Medical History:   Diagnosis Date   • Abdominal pain    • Anxiety    • Degenerative spondylolisthesis, L4-5    • GERD (gastroesophageal reflux disease)    • Glaucoma    • Greater trochanteric bursitis     left   • High cholesterol    • History of chicken pox     Childhood   • History of diverticulosis    • History of foreign travel 02/2017    [place]    • History of mitral valve prolapse    • Infectious viral hepatitis 1976    A   • Irregular heart beat     ON MED SEVERAL YEARS   • Low back pain     GETTING EPIDURALS, DR CHAPA   • Osteoarthritis     Spine w/radiculopathy, lumbar region   • PONV (postoperative nausea and vomiting)    • Restless leg    • S/P epidural steroid injection    • Sleep  "apnea with use of continuous positive airway pressure (CPAP)    • Umbilical hernia        Past Surgical History:  Past Surgical History:   Procedure Laterality Date   • APPENDECTOMY  1964   • BLADDER SURGERY  2012   • BUNIONECTOMY Bilateral    • CATARACT EXTRACTION Bilateral    • COLONOSCOPY     • GLAUCOMA SURGERY Bilateral    • HAMMER TOE REPAIR Right 2000   • HYSTERECTOMY  1992   • SEN'S NEUROMA EXCISION Left 2012   • PELVIC FLOOR REPAIR     • SHOULDER ARTHROSCOPY Right 09/25/2014    With rotator cuff repair 2 x 1.5 cm, subacromial decompression w/acromioplasty, debridement of labral tearing and biceps tenotomy and chondroplasty   • SKIN BIOPSY     • TONSILLECTOMY  1963   • UMBILICAL HERNIA REPAIR N/A 7/17/2017    Procedure: UMBILICAL HERNIA REPAIR;  Surgeon: Haseeb Monterroso Jr., MD;  Location: [facility];  Service:        Family History:  Family History   Problem Relation Age of Onset   • Heart disease Mother    • Hyperlipidemia Mother    • Stroke Mother    • Hypertension Mother    • Heart disease Father    • Heart disease Brother    • Arthritis Brother    • Hyperlipidemia Brother    • Stroke Brother    • Hypertension Brother    • Malig Hyperthermia Neg Hx        Social History:   reports that she has quit smoking. Her smoking use included Cigarettes. She has a 15.00 pack-year smoking history. She has never used smokeless tobacco. She reports that she drinks about 0.6 oz of alcohol per week  She reports that she does not use illicit drugs.    Medications:   No prescriptions prior to admission.       Allergies:  Bee venom; Ceclor [cefaclor]; and Cherry    ROS:    Pertinent items are noted in HPI     Objective     Blood pressure 127/67, pulse 66, temperature 98.1 °F (36.7 °C), temperature source Oral, resp. rate 14, height 68\" (172.7 cm), weight 157 lb (71.2 kg), SpO2 100 %.    Physical Exam   Constitutional: Pt is oriented to person, place, and time and well-developed, well-nourished, and in no distress. "   Mouth/Throat: Oropharynx is clear and moist.   Neck: Normal range of motion.   Cardiovascular: Normal rate, regular rhythm and normal heart sounds.    Pulmonary/Chest: Effort normal and breath sounds normal.   Abdominal: Soft. Nontender  Skin: Skin is warm and dry.   Psychiatric: Mood, memory, affect and judgment normal.     Assessment/Plan     Diagnosis:  Pancreatic mass    Anticipated Surgical Procedure:  EUS with FNA    The risks, benefits, and alternatives of this procedure have been discussed with the patient or the responsible party- the patient understands and agrees to proceed.

## 2017-12-05 ENCOUNTER — HOSPITAL ENCOUNTER (OUTPATIENT)
Dept: PET IMAGING | Facility: HOSPITAL | Age: 72
Discharge: HOME | End: 2017-12-05
Attending: INTERNAL MEDICINE | Admitting: INTERNAL MEDICINE

## 2017-12-05 ENCOUNTER — LAB (OUTPATIENT)
Dept: LAB | Facility: HOSPITAL | Age: 72
End: 2017-12-05

## 2017-12-05 DIAGNOSIS — K86.89 PANCREATIC MASS: ICD-10-CM

## 2017-12-05 DIAGNOSIS — D37.9 NEOPLASM OF UNCERTAIN BEHAVIOR OF DIGESTIVE ORGAN: ICD-10-CM

## 2017-12-05 LAB
ALBUMIN SERPL-MCNC: 4.3 G/DL (ref 3.5–5.2)
ALBUMIN/GLOB SERPL: 1.7 G/DL (ref 1.1–2.4)
ALP SERPL-CCNC: 73 U/L (ref 38–116)
ALT SERPL W P-5'-P-CCNC: 22 U/L (ref 0–33)
ANION GAP SERPL CALCULATED.3IONS-SCNC: 12.8 MMOL/L
AST SERPL-CCNC: 27 U/L (ref 0–32)
BASOPHILS # BLD AUTO: 0.05 10*3/MM3 (ref 0–0.1)
BASOPHILS NFR BLD AUTO: 1.2 % (ref 0–1.1)
BILIRUB SERPL-MCNC: 0.9 MG/DL (ref 0.1–1.2)
BUN BLD-MCNC: 13 MG/DL (ref 6–20)
BUN/CREAT SERPL: 19.4 (ref 7.3–30)
CALCIUM SPEC-SCNC: 9.4 MG/DL (ref 8.5–10.2)
CANCER AG19-9 SERPL-ACNC: 9.9 U/ML
CHLORIDE SERPL-SCNC: 105 MMOL/L (ref 98–107)
CO2 SERPL-SCNC: 26.2 MMOL/L (ref 22–29)
CREAT BLD-MCNC: 0.67 MG/DL (ref 0.6–1.1)
CREAT BLDA-MCNC: 0.7 MG/DL (ref 0.6–1.3)
DEPRECATED RDW RBC AUTO: 45.7 FL (ref 37–49)
EOSINOPHIL # BLD AUTO: 0.17 10*3/MM3 (ref 0–0.36)
EOSINOPHIL NFR BLD AUTO: 4 % (ref 1–5)
ERYTHROCYTE [DISTWIDTH] IN BLOOD BY AUTOMATED COUNT: 12.7 % (ref 11.7–14.5)
GFR SERPL CREATININE-BSD FRML MDRD: 87 ML/MIN/1.73
GLOBULIN UR ELPH-MCNC: 2.5 GM/DL (ref 1.8–3.5)
GLUCOSE BLD-MCNC: 77 MG/DL (ref 74–124)
HCT VFR BLD AUTO: 41.8 % (ref 34–45)
HGB BLD-MCNC: 13.5 G/DL (ref 11.5–14.9)
IMM GRANULOCYTES # BLD: 0.01 10*3/MM3 (ref 0–0.03)
IMM GRANULOCYTES NFR BLD: 0.2 % (ref 0–0.5)
LYMPHOCYTES # BLD AUTO: 1.23 10*3/MM3 (ref 1–3.5)
LYMPHOCYTES NFR BLD AUTO: 29.1 % (ref 20–49)
MCH RBC QN AUTO: 31.5 PG (ref 27–33)
MCHC RBC AUTO-ENTMCNC: 32.3 G/DL (ref 32–35)
MCV RBC AUTO: 97.7 FL (ref 83–97)
MONOCYTES # BLD AUTO: 0.4 10*3/MM3 (ref 0.25–0.8)
MONOCYTES NFR BLD AUTO: 9.5 % (ref 4–12)
NEUTROPHILS # BLD AUTO: 2.37 10*3/MM3 (ref 1.5–7)
NEUTROPHILS NFR BLD AUTO: 56 % (ref 39–75)
NRBC BLD MANUAL-RTO: 0 /100 WBC (ref 0–0)
PLATELET # BLD AUTO: 211 10*3/MM3 (ref 150–375)
PMV BLD AUTO: 11 FL (ref 8.9–12.1)
POTASSIUM BLD-SCNC: 3.4 MMOL/L (ref 3.5–4.7)
PROT SERPL-MCNC: 6.8 G/DL (ref 6.3–8)
RBC # BLD AUTO: 4.28 10*6/MM3 (ref 3.9–5)
SODIUM BLD-SCNC: 144 MMOL/L (ref 134–145)
WBC NRBC COR # BLD: 4.23 10*3/MM3 (ref 4–10)

## 2017-12-05 PROCEDURE — 80053 COMPREHEN METABOLIC PANEL: CPT | Performed by: INTERNAL MEDICINE

## 2017-12-05 PROCEDURE — 36415 COLL VENOUS BLD VENIPUNCTURE: CPT | Performed by: INTERNAL MEDICINE

## 2017-12-05 PROCEDURE — 86301 IMMUNOASSAY TUMOR CA 19-9: CPT | Performed by: INTERNAL MEDICINE

## 2017-12-05 PROCEDURE — 85025 COMPLETE CBC W/AUTO DIFF WBC: CPT | Performed by: INTERNAL MEDICINE

## 2017-12-05 PROCEDURE — 74160 CT ABDOMEN W/CONTRAST: CPT

## 2017-12-05 PROCEDURE — 0 DIATRIZOATE MEGLUMINE & SODIUM PER 1 ML: Performed by: INTERNAL MEDICINE

## 2017-12-05 PROCEDURE — 82565 ASSAY OF CREATININE: CPT

## 2017-12-05 PROCEDURE — 0 IOPAMIDOL 61 % SOLUTION: Performed by: INTERNAL MEDICINE

## 2017-12-05 RX ADMIN — DIATRIZOATE MEGLUMINE AND DIATRIZOATE SODIUM 30 ML: 660; 100 LIQUID ORAL; RECTAL at 08:12

## 2017-12-05 RX ADMIN — IOPAMIDOL 85 ML: 612 INJECTION, SOLUTION INTRAVENOUS at 08:47

## 2017-12-12 ENCOUNTER — APPOINTMENT (OUTPATIENT)
Dept: LAB | Facility: HOSPITAL | Age: 72
End: 2017-12-12
Attending: INTERNAL MEDICINE

## 2017-12-12 ENCOUNTER — OFFICE VISIT (OUTPATIENT)
Dept: ONCOLOGY | Facility: CLINIC | Age: 72
End: 2017-12-12

## 2017-12-12 VITALS
RESPIRATION RATE: 16 BRPM | BODY MASS INDEX: 23.98 KG/M2 | TEMPERATURE: 97.5 F | WEIGHT: 158.2 LBS | DIASTOLIC BLOOD PRESSURE: 70 MMHG | SYSTOLIC BLOOD PRESSURE: 110 MMHG | HEART RATE: 74 BPM | HEIGHT: 68 IN

## 2017-12-12 DIAGNOSIS — C25.9: ICD-10-CM

## 2017-12-12 DIAGNOSIS — K86.89 PANCREATIC MASS: Primary | ICD-10-CM

## 2017-12-12 DIAGNOSIS — R11.0 NAUSEA: ICD-10-CM

## 2017-12-12 PROBLEM — C25.1 MALIGNANT NEOPLASM OF BODY OF PANCREAS (HCC): Status: RESOLVED | Noted: 2017-09-21 | Resolved: 2017-12-12

## 2017-12-12 PROCEDURE — 99215 OFFICE O/P EST HI 40 MIN: CPT | Performed by: INTERNAL MEDICINE

## 2017-12-12 PROCEDURE — G0463 HOSPITAL OUTPT CLINIC VISIT: HCPCS | Performed by: INTERNAL MEDICINE

## 2017-12-13 ENCOUNTER — TELEPHONE (OUTPATIENT)
Dept: GASTROENTEROLOGY | Facility: CLINIC | Age: 72
End: 2017-12-13

## 2017-12-13 NOTE — TELEPHONE ENCOUNTER
----- Message from Jethro Pitts MD sent at 11/22/2017 12:13 PM EST -----  FNA was non-diagnostic, but certainly showed no signs of malignancy.    We are awaiting more information from the send out labs.

## 2017-12-15 NOTE — PROGRESS NOTES
Subjective .     REASON FOR CONSULTATION:     1.  Persistent nausea, leading to discovery of a large pancreatic body mass in July 2017.  CT-guided core needle biopsy on 9/22/2017 reported microcystic cystadenoma.   2.  Patient was seen by Dr. Maravilla at the [facility] surgical oncology division on 10/12/2017.  Dr. Maravilla recommended clinic follow-up.    3.  Patient had EUS guided pancreatic mass aspiration and biopsy on 11/16/2017, with no evidence of malignancy.  Aspiration of cyst fluid showed low-level CEA and high level amylase.                                  HISTORY OF PRESENT ILLNESS:  The patient is a 72 y.o. year old female who is here for 3-month re-evaluation, after her repeated a CT scan and the laboratory study obtained on 12/5/2017.  Patient is accompanied by her .      I saw her originally on 9/21/2017 for initial evaluation because of her large pancreatic mass.  Since that time she had a CT-guided needle biopsy of the pancreas mass on 9/22/2017 which reported microcystic cystadenoma.  She was also seen by Dr. Maravilla at the [facility] surgical oncology service on 10/12/2017.  Dr. Maravilla recommended clinic follow-up, since this kind of pancreatic lesion does not transform to malignant lesion.  I also spoke to her primary care physician Dr. Piedra, and GI specialist Dr. Pitts, we'll performed EUS examination with aspiration of cystic fluid and fine needle biopsy of the pancreatic mass on 11/16/2017.  This study also showed a no evidence of malignancy.  The cystic fluid showed a significantly elevated amylase but low CEA level,  fits with pancreatic cysts.      Patient also had a repeated CT scan for the abdomen on 12/5/2017 and she is here for reevaluation.  Her CT scan showed a slightly increased size of this cystic lesion.  Laboratory study showed an normal CA 19-9 and unremarkable CMP and normal CBC.     Patient reports she still has persistent nausea.   She tried scopolamine patch and did not help.  Zofran also did not help.  Phenergan causes her too sleepy, so was Ativan.  Patient recently was seen by Dr. Orlando, was started patient on Creon, which helps with digestion.     Patient had a CT scan examination on 12/5/2017 which showed a mildly increased the size of this pancreatic body mass, measuring 4.2×5.1 cm compared to 3.5×5.1 cm on 9/19/2017.  Laboratory study showed a normal CA 19-9 level at 9.9 ng/mL.  Chemistry lab reported normal liver function panel, and virtually unremarkable CMP.  CBC also showed normal results.      Past Medical History:   Diagnosis Date   • Abdominal pain    • Anxiety    • Degenerative spondylolisthesis, L4-5    • GERD (gastroesophageal reflux disease)    • Glaucoma    • Greater trochanteric bursitis     left   • High cholesterol    • History of chicken pox     Childhood   • History of diverticulosis    • History of foreign travel 02/2017    [place]    • History of mitral valve prolapse    • Infectious viral hepatitis 1976    A   • Irregular heart beat     ON MED SEVERAL YEARS   • Low back pain     GETTING EPIDURALS, DR CHAPA   • Osteoarthritis     Spine w/radiculopathy, lumbar region   • PONV (postoperative nausea and vomiting)    • Restless leg    • S/P epidural steroid injection    • Sleep apnea with use of continuous positive airway pressure (CPAP)    • Umbilical hernia    History of mononucleosis   History of gastric ulcer     Past Surgical History:   Procedure Laterality Date   • APPENDECTOMY  1964   • BLADDER SURGERY  2012   • BUNIONECTOMY Bilateral    • CATARACT EXTRACTION Bilateral    • COLONOSCOPY     • GLAUCOMA SURGERY Bilateral    • HAMMER TOE REPAIR Right 2000   • HYSTERECTOMY  1992   • SEN'S NEUROMA EXCISION Left 2012   • PELVIC FLOOR REPAIR     • SHOULDER ARTHROSCOPY Right 09/25/2014    With rotator cuff repair 2 x 1.5 cm, subacromial decompression w/acromioplasty, debridement of labral tearing and biceps  tenotomy and chondroplasty   • SKIN BIOPSY     • TONSILLECTOMY  1963   • UMBILICAL HERNIA REPAIR N/A 7/17/2017    Procedure: UMBILICAL HERNIA REPAIR;  Surgeon: Haseeb Monterroso Jr., MD;  Location: [facility];  Service:        HEMATOLOGIC/ONCOLOGIC HISTORY:  The patient had newly diagnosed mass in the pancreatic body for which she already has a scheduled appointment tomorrow for a CT-guided biopsy.  According to the patient, she had nausea that started sometime in this past 06/2017.  She was seen by her primary care physician, Dr. Piedra, and treated conservatively with medication.  In the meantime, she was also found to have an umbilical hernia and she was referred to the Surgeon, Dr. Haseeb Monterroso Jr, in late 06/2017.  Dr. Monterroso performed an umbilical hernia repair with mesh on 07/17/2017.  After surgery, the patient noticed there was no improvement of her nausea at all.  Subsequently, she had an ultrasound examination on 09/13/2017 at [facility].  This study reported a mass measuring 3.8 x 3.7 x 4.4 cm in the tail of the pancreas.  There was no dilatation of the common bile duct at 0.3 cm.  The liver appeared normal and the right kidney appeared normal.  The patient further had a CT scan of the abdomen with IV contrast obtained on 09/19/2017.  This study reported a pancreatic body mass measuring 4.5 cm x 3.4 cm, heterogenous, highly suspicious for primary pancreatic cancer.  There was no evidence of metastatic disease in the abdomen.  There was a small low-density in the anterior liver, possibly fatty infiltration.  Gallbladder, spleen, adrenal glands, and kidneys were all unremarkable except small bilateral kidney cysts.      The patient reports she has had weight loss, about 5-6 pounds in the past 2 months, due to her nausea and decreased appetite.  She denies abdominal pain.  The patient otherwise is at baseline condition.      Outside laboratory study on September 12, 2017 reported a  creatinine 0.67, glucose 114 otherwise unremarkable CMP including normal liver function panel, normal amylase and lipase.  WBC 6400, hemoglobin 12.9 and platelets 204,000.  Patient reports Zofran does not work well enough for her.  And Phenergan causes her too sleepy.        MEDICATIONS    Current Outpatient Prescriptions:   •  aspirin 81 MG EC tablet, Take 81 mg by mouth Daily. PT TO HOLD MED PRIOR TO OR, Disp: , Rfl:   •  Coenzyme Q10-Levocarnitine 100-20 MG capsule, Take 1 tablet by mouth Daily. PT TO HOLD MED PRIOR TO OR, Disp: , Rfl:   •  EPINEPHRINE IJ, Inject  as directed As Needed (pt has epi pen)., Disp: , Rfl:   •  Ezetimibe (ZETIA PO), Take 10 mg by mouth 2 (Two) Times a Day., Disp: , Rfl:   •  fluticasone (FLONASE) 50 MCG/ACT nasal spray, 1 spray into each nostril Every Evening., Disp: , Rfl:   •  Glucosamine-Chondroitin 250-200 MG capsule, Take 1 capsule by mouth Daily., Disp: , Rfl:   •  LORazepam (ATIVAN) 0.5 MG tablet, Take 0.5 mg by mouth Every 8 (Eight) Hours As Needed for Anxiety., Disp: , Rfl:   •  montelukast (SINGULAIR) 10 MG tablet, Take 10 mg by mouth Every Night., Disp: , Rfl:   •  Nutritional Supplements (JUICE PLUS FIBRE PO), Take 2 tablets by mouth Daily., Disp: , Rfl:   •  omeprazole (priLOSEC) 40 MG capsule, Take 40 mg by mouth 2 (Two) Times a Day., Disp: , Rfl:   •  Pancrelipase, Lip-Prot-Amyl, (CREON) 24863 units capsule delayed-release particles, Take 1 capsule by mouth 4 (Four) Times a Day. With Meals and snack, Disp: 120 capsule, Rfl: 11  •  rosuvastatin (CRESTOR) 5 MG tablet, Take 5 mg by mouth. MONDAYS, WEDNESDAYS AND FRIDAYS, Disp: , Rfl:   •  Unable to find, Take 1 each by mouth Daily. RELIEF FACTOR- 4 CAPSULES DAILY PT TO HOLD MED PRIOR TO OR, Disp: , Rfl:   •  verapamil SR (CALAN-SR) 180 MG CR tablet, Take 180 mg by mouth Every Night., Disp: , Rfl:     ALLERGIES:     Allergies   Allergen Reactions   • Ceclor [Cefaclor] Itching and Swelling     THROAT SWELLING   • Alma Rosa Banda       DIFFICULTY BREATHING       SOCIAL HISTORY:       Social History     Social History   • Marital status:      Spouse name: Ellis   • Number of children: 2   • Years of education: College education      Occupational History   •   Retired     Social History Main Topics   • Smoking status: Former Smoker     Packs/day: 0.50     Years: 30.00     Types: Cigarettes   • Smokeless tobacco: Never Used      Comment: QUIT 22 YEARS AGO//25 yrs, 1 ppd   • Alcohol use 0.6 oz/week     1 Glasses of wine per week      Comment: per day   • Drug use: No   • Sexual activity: Defer         FAMILY HISTORY:  Family History   Problem Relation Age of Onset   • Heart disease Mother    • Hyperlipidemia Mother    • Stroke Mother    • Hypertension Mother    • Heart disease Father    • Heart disease Brother    • Arthritis Brother    • Hyperlipidemia Brother    • Stroke Brother    • Hypertension Brother    • Malig Hyperthermia Neg Hx    No family history of malignancy.    REVIEW OF SYSTEMS:  GENERAL: See history of present illness.   No fevers, chills, sweats.    SKIN: No nonhealing lesions. No rashes.  HEME/LYMPH: No easy bruising, bleeding.   No swollen nodes.   EYES: No vision changes or diplopia.   ENT: No tinnitus, hearing loss, gum bleeding, epistaxis, hoarseness or dysphagia.   RESPIRATORY: No cough, shortness of breath, hemoptysis or wheezing.   CVS: No chest pain, palpitations, orthopnea, dyspnea on exertion or PND.   GI: Has nausea and poor appetite, no vomiting, has chronic constipation.  No melena or hematochezia.   No abdominal pain.    : No lower tract obstructive symptoms, dysuria or hematuria.   MUSCULOSKELETAL: No bone pain.  No joint stiffness.   NEUROLOGICAL: No global weakness, loss of consciousness or seizures.   PSYCHIATRIC: No increased nervousness, mood changes or depression.     Objective    Vitals:    12/12/17 1022   BP: 110/70   Pulse: 74   Resp: 16   Temp: 97.5 °F (36.4 °C)   Weight: 71.8 kg  "(158 lb 3.2 oz)   Height: 173 cm (68.11\")   PainSc: 0-No pain     Current Status 12/12/2017   ECOG score 0      PHYSICAL EXAM:    GENERAL:  Well-developed, well-nourished female in no acute distress.   SKIN:  Warm, dry without rashes, purpura or petechiae.  EYES:  Pupils equal, round and reactive to light.  EOMs intact.  Conjunctivae normal.  EARS:  Hearing intact.  NECK:  Supple with good range of motion; no thyromegaly or masses.  LYMPHATICS:  No cervical, supraclavicular adenopathy.  CHEST:  Lungs clear to auscultation. Good airflow.  CARDIAC:  Regular rate and rhythm without murmurs, rubs or gallops. Normal S1,S2.  ABDOMEN:  Soft, nontender with no hepatosplenomegaly or masses.  Bowel sounds normal.   EXTREMITIES:  No clubbing, cyanosis or edema.  NEUROLOGICAL:  Cranial Nerves II-XII grossly intact.  No focal neurological deficits.  PSYCHIATRIC:  Normal affect and mood.    RECENT LABS:    Lab Results   Component Value Date    WBC 4.23 12/05/2017    HGB 13.5 12/05/2017    HCT 41.8 12/05/2017    MCV 97.7 (H) 12/05/2017     12/05/2017     Lab Results   Component Value Date    NEUTROABS 2.37 12/05/2017     Lab Results   Component Value Date    GLUCOSE 77 12/05/2017    BUN 13 12/05/2017    CREATININE 0.70 12/05/2017    EGFRIFNONA 87 12/05/2017    BCR 19.4 12/05/2017    K 3.4 (L) 12/05/2017    CO2 26.2 12/05/2017    CALCIUM 9.4 12/05/2017    ALBUMIN 4.30 12/05/2017    LABIL2 1.7 12/05/2017    AST 27 12/05/2017    ALT 22 12/05/2017     Sodium   Date Value Ref Range Status   12/05/2017 144 134 - 145 mmol/L Final     Potassium   Date Value Ref Range Status   12/05/2017 3.4 (L) 3.5 - 4.7 mmol/L Final     Total Bilirubin   Date Value Ref Range Status   12/05/2017 0.9 0.1 - 1.2 mg/dL Final     Alkaline Phosphatase   Date Value Ref Range Status   12/05/2017 73 38 - 116 U/L Final   ]  : at 9.9 ng/ml On 12/5/2017.       IMAGE STUDIES:    1.  Endosonographic Finding  11/16/2017  There was no sign of significant " endosonographic abnormality in the common bile duct. The maximum diameter of the  duct was 5 mm.  A round mass was identified in the pancreatic body. The mass was isoechoic, heterogenous and mixed solid and cystic.  The lesion was hypervascular. The mass measured 35 mm by 35 mm in maximal cross-sectional diameter. The  endosonographic borders were well-defined. The remainder of the pancreas was examined. The endosonographic  appearance of parenchyma and the upstream pancreatic duct indicated a normal appearing duct. Diagnostic needle  aspiration for fluid was performed. Color Doppler imaging was utilized prior to needle puncture to confirm a lack of  significant vascular structures within the needle path. One pass was made with the 25 gauge needle using a transgastric  approach. The amount of fluid collected was 0.5 mL. The fluid was serosanguinous and watery. Sample(s) were sent for  amylase concentration and CEA.  Fine needle aspiration for cytology was performed. Color Doppler imaging was utilized prior to needle puncture to  confirm a lack of significant vascular structures within the needle path. Three passes were made with the 25 gauge  needle using a transgastric approach. A stylet was used. A cytotechnologist was present to evaluate the adequacy of the  specimen. The cellularity of the specimen was adequate. Final cytology results are pending.       2. CT ABDOMEN WITH CONTRAST 12/5/2017      HISTORY: Follow up for pancreatic mass.      TECHNIQUE: Arterial phase imaging of the pancreas was obtained following  administration of intravenous contrast. Portal venous phase imaging of  the abdomen was then obtained. The patient was also given oral contrast.  Coronal and sagittal reformats were obtained.      COMPARISON: CT abdomen from 09/19/2017.      FINDINGS: There is a relatively well-defined pancreatic mass  reidentified. The lesion today measures 4.2 x 5.1 cm in comparison to  3.5 x 5.1 cm when measured in a  "similar location. The lesion  demonstrates arterial enhancement with multiple low density areas within  it. The rest of the pancreas is unremarkable without ductal dilatation,  there is no peripancreatic lymphadenopathy, fluid or stranding  identified. The liver is unremarkable. The gallbladder, spleen,  bilateral adrenal glands are normal. Both kidneys are normal in size and  attenuation. Bilateral cortical lesions within the kidneys are too small  to accurately characterize and likely represent simple cysts. The small  and large bowel is of normal caliber. No pathological retroperitoneal  lymphadenopathy. Small subcutaneous lesion within the right anterior  abdominal wall is unchanged and likely representative of a sebaceous  cyst.      IMPRESSION:  Mild interval increase in size of a pancreatic body lesion  which demonstrates hypervascularity and internal cystic component. The  main pancreatic duct is not dilated. At this point, the most likely  differential includes pancreas neuroendocrine tumor or a microcystic  serous cystadenoma. An MRI of the abdomen and EUS with biopsy would be  needed for characterization.      Radiation dose reduction techniques were utilized, including automated  exposure control and exposure modulation based on body size.    Pathology:   Final Diagnosis  11/16/2017    1.  \"RANDOM GASTRIC\":             PATCHY MILD CHRONIC GASTRITIS.            NO INTESTINAL METAPLASIA.            DIFF-QUIK STAIN: NO HELICOBACTER SEEN.     2. \"GASTRIC BODY\":             POLYPOID MUCOSAL HYPERPLASIA.            NEGATIVE FOR INTESTINAL METAPLASIA.            DIFF-QUIK STAIN: NO HELICOBACTER SEEN     Final Diagnosis 11/16/2017    1. \"PANCREATIC MASS\", FINE NEEDLE ASPIRATION WITH CELL BLOCK:             BLOOD AND OCCASIONAL AGGREGATES OF FIBRINOUS MATERIAL.              ESSENTIALLY NO EPITHELIAL OR STROMAL ELEMENTS ARE IDENTIFIED IN THIS PREPARATION.             NO MALIGNANT CELLS IDENTIFIED.     2.  " "\"PANCREATIC CYST FLUID\":               MATERIAL SENT FOR INTERPACE LABORATORY MOLECULAR STUDIES, AS REQUESTED BY                     SUBMITTING PHYSICIAN.             RESULTS OF THESE STUDIES TO BE REPORTED IN AN ADDENDUM.       COMMENT: See [id number] for associated surgical pathology report.  Review of the surgical pathology files shows that a recent pancreatic needle biopsy from September 2017 (surgical pathology case #[id number]) demonstrated a microcystic cystadenoma.  There is no definitive evidence of tumor in the current specimen.   Amylase 12,170 U/L;   ng/mL         Assessment/Plan      1.  Pancreatic body mass, with CT-guided biopsy on 9/22/2017 reported microcystic cystadenoma, which is a benign lesion.  Had a negative tumor marker CA 19-9.  She subsequently had evaluation at the surgical oncology division at the [facility] by Dr. Maravilla were recommended clinical follow-up.  Patient subsequently had EUS examination with biopsy and aspiration of the cystic ankle mass on 11/16/2017.  Again there is no evidence of malignancy.  The aspiration of the cystic fluid reported a low level CEA and a high level amylase, fits with benign cysts.     She had a CT scan for pancreatic protocol again on 12/5/2017, reported slightly increased size of this lesion in 1 dimension, and the other dimension is stable.  Repeated tumor marker CA 19-9 is again normal.     So far there is no evidence of malignancy.  However this patient has persistent nausea which not responding to medicine either without efficacy, or making her too sleepy.      I recommended to have repeated a CT scan for the pancreatic protocol in 3 months for follow-up evaluation.  We'll obtain laboratory study including CA 19-9 again.  I discussed with patient, that if patient has continued growing of this tumor, certainly there will be suspicion for transforming into true malignancy, and persistent nausea and unable to improve her " symptom, even it is benign, she may still need surgery.    2.  Nausea and weight loss.  Her weight seems stabilized in the past 3 months, actually slightly gained several pounds.       PLAN:  1.  Repeat CT with pancreatic protocol in 3 months.  We'll check labs CBC CMP and CA 19-9 and a same time on CT scan.  2.   I will bring patient back for reevaluation, one week after her CT scan and labs.        More than 60 min were used for patient care, including reviewing multiple pathology report, procedure note, imaging studies and I reviewed CT scan images myself, over half of that time were for counseling.        RAMONA LATHAM M.D., Ph.D.    12/12/2017         CC:  Zoran Piedra MD

## 2017-12-18 LAB
CYTO UR: NORMAL
LAB AP CASE REPORT: NORMAL
LAB AP DIAGNOSIS COMMENT: NORMAL
LAB AP FLOW CYTOMETRY REPORT,ADDENDUM: NORMAL
LAB AP NON-GYN SPECIMEN ADEQUACY: NORMAL
Lab: NORMAL
PATH REPORT.ADDENDUM SPEC: NORMAL
PATH REPORT.FINAL DX SPEC: NORMAL
PATH REPORT.GROSS SPEC: NORMAL

## 2018-01-08 ENCOUNTER — TELEPHONE (OUTPATIENT)
Dept: GASTROENTEROLOGY | Facility: CLINIC | Age: 73
End: 2018-01-08

## 2018-01-08 NOTE — TELEPHONE ENCOUNTER
----- Message from Batsheva Gutierrez sent at 1/8/2018  2:53 PM EST -----  Regarding: RX  Contact: [phone]  PT CALLED WITH QUESTIONS ABOUT CREON AND THE COST   Patient requested her Creon script be sent to her mail order pharmacy because it is cheaper. E-scribed to [facility] per request of patient.

## 2018-02-06 ENCOUNTER — OFFICE VISIT (OUTPATIENT)
Dept: GASTROENTEROLOGY | Facility: CLINIC | Age: 73
End: 2018-02-06

## 2018-02-06 VITALS
SYSTOLIC BLOOD PRESSURE: 106 MMHG | BODY MASS INDEX: 23.64 KG/M2 | WEIGHT: 156 LBS | HEIGHT: 68 IN | TEMPERATURE: 97.4 F | DIASTOLIC BLOOD PRESSURE: 68 MMHG

## 2018-02-06 DIAGNOSIS — K86.89 PANCREATIC MASS: Primary | ICD-10-CM

## 2018-02-06 PROCEDURE — 99213 OFFICE O/P EST LOW 20 MIN: CPT | Performed by: INTERNAL MEDICINE

## 2018-02-06 NOTE — PROGRESS NOTES
Chief Complaint   Patient presents with   • pancreatic cyst   • Alopecia     medication related to Creon ?       Elsie Burleson is a  72 y.o. female here for a follow up visit for Pancreatic tumor.    HPI    Patient's 32-year-old female with history of hypercholesterolemia being followed for pancreatic mass.  Patient's current concern is for hair loss but still with change in appetite and taste.  Patient's undergone percutaneous biopsy as well as endoscopic ultrasound biopsy revealing benign cyst.  Patient seen by Dr. Maravilla who felt resection may lead to diabetes and recommended against it.  Patient with continued symptoms requesting further recommendations.  Patient reports unable to gain weight and is somewhat concerned by this.  Patient also reports increased hair loss noted.  Patient does however feel improved since starting on Creon therapy.    Past Medical History:   Diagnosis Date   • Abdominal pain    • Anxiety    • Degenerative spondylolisthesis, L4-5    • GERD (gastroesophageal reflux disease)    • Glaucoma    • Greater trochanteric bursitis     left   • High cholesterol    • History of chicken pox     Childhood   • History of diverticulosis    • History of foreign travel 02/2017    [place]ve[place]    • History of mitral valve prolapse    • Infectious viral hepatitis 1976    A   • Irregular heart beat     ON MED SEVERAL YEARS   • Low back pain     GETTING EPIDURALS, DR CHAPA   • Osteoarthritis     Spine w/radiculopathy, lumbar region   • PONV (postoperative nausea and vomiting)    • Restless leg    • S/P epidural steroid injection    • Sleep apnea with use of continuous positive airway pressure (CPAP)    • Umbilical hernia          Current Outpatient Prescriptions:   •  aspirin 81 MG EC tablet, Take 81 mg by mouth Daily. PT TO HOLD MED PRIOR TO OR, Disp: , Rfl:   •  Coenzyme Q10-Levocarnitine 100-20 MG capsule, Take 1 tablet by mouth Daily. PT TO HOLD MED PRIOR TO OR, Disp: , Rfl:   •  EPINEPHRINE IJ,  Inject  as directed As Needed (pt has epi pen)., Disp: , Rfl:   •  esomeprazole (nexIUM) 20 MG capsule, Take 20 mg by mouth 2 (Two) Times a Day., Disp: , Rfl:   •  Ezetimibe (ZETIA PO), Take 10 mg by mouth 2 (Two) Times a Day., Disp: , Rfl:   •  fluticasone (FLONASE) 50 MCG/ACT nasal spray, 1 spray into each nostril Every Evening., Disp: , Rfl:   •  Glucosamine-Chondroitin 250-200 MG capsule, Take 1 capsule by mouth Daily., Disp: , Rfl:   •  LORazepam (ATIVAN) 0.5 MG tablet, Take 0.5 mg by mouth Every 8 (Eight) Hours As Needed for Anxiety., Disp: , Rfl:   •  montelukast (SINGULAIR) 10 MG tablet, Take 10 mg by mouth Every Night., Disp: , Rfl:   •  Nutritional Supplements (JUICE PLUS FIBRE PO), Take 2 tablets by mouth Daily., Disp: , Rfl:   •  Pancrelipase, Lip-Prot-Amyl, 74306 units capsule delayed-release particles, Take 36,000 mg by mouth 4 (Four) Times a Day. With meals and snack, Disp: 360 capsule, Rfl: 3  •  rosuvastatin (CRESTOR) 5 MG tablet, Take 5 mg by mouth. MONDAYS, WEDNESDAYS AND FRIDAYS, Disp: , Rfl:   •  Unable to find, Take 1 each by mouth Daily. RELIEF FACTOR- 4 CAPSULES DAILY PT TO HOLD MED PRIOR TO OR, Disp: , Rfl:   •  omeprazole (priLOSEC) 40 MG capsule, Take 40 mg by mouth 2 (Two) Times a Day., Disp: , Rfl:   •  verapamil SR (CALAN-SR) 180 MG CR tablet, Take 180 mg by mouth Every Night., Disp: , Rfl:     Allergies   Allergen Reactions   • Bee Venom Hives   • Ceclor [Cefaclor] Itching and Swelling     THROAT SWELLING   • Cherry      DIFFICULTY BREATHING       Social History     Social History   • Marital status:      Spouse name: Ellis   • Number of children: 2   • Years of education: College     Occupational History   •  Retired     Social History Main Topics   • Smoking status: Former Smoker     Packs/day: 0.50     Types: Cigarettes     Start date: 1963     Quit date: 1995   • Smokeless tobacco: Never Used      Comment: QUIT 22 YEARS AGO//25 yrs, 1 ppd   • Alcohol use 0.6 oz/week     1  Glasses of wine per week      Comment: per day   • Drug use: No   • Sexual activity: Defer     Other Topics Concern   • Not on file     Social History Narrative       Family History   Problem Relation Age of Onset   • Heart disease Mother    • Hyperlipidemia Mother    • Stroke Mother    • Hypertension Mother    • Heart disease Father    • Heart disease Brother    • Arthritis Brother    • Hyperlipidemia Brother    • Stroke Brother    • Hypertension Brother    • Malig Hyperthermia Neg Hx        Review of Systems   Constitutional: Negative.    HENT: Negative.    Respiratory: Negative.    Cardiovascular: Negative.    Gastrointestinal: Negative.    Musculoskeletal: Negative.    Skin: Negative.    Hematological: Negative.        Vitals:    02/06/18 1104   BP: 106/68   Temp: 97.4 °F (36.3 °C)       Physical Exam   Constitutional: She is oriented to person, place, and time. She appears well-developed and well-nourished.   HENT:   Head: Normocephalic and atraumatic.   Abdominal: Soft. Bowel sounds are normal. She exhibits no distension and no mass. There is no tenderness. No hernia.   Neurological: She is alert and oriented to person, place, and time.   Skin: Skin is warm and dry. No rash noted.   Psychiatric: She has a normal mood and affect. Her behavior is normal.   Vitals reviewed.      No visits with results within 2 Month(s) from this visit.  Latest known visit with results is:    Lab on 12/05/2017   Component Date Value Ref Range Status   • Glucose 12/05/2017 77  74 - 124 mg/dL Final   • BUN 12/05/2017 13  6 - 20 mg/dL Final   • Creatinine 12/05/2017 0.67  0.60 - 1.10 mg/dL Final   • Sodium 12/05/2017 144  134 - 145 mmol/L Final   • Potassium 12/05/2017 3.4* 3.5 - 4.7 mmol/L Final   • Chloride 12/05/2017 105  98 - 107 mmol/L Final   • CO2 12/05/2017 26.2  22.0 - 29.0 mmol/L Final   • Calcium 12/05/2017 9.4  8.5 - 10.2 mg/dL Final   • Total Protein 12/05/2017 6.8  6.3 - 8.0 g/dL Final   • Albumin 12/05/2017 4.30  3.50  - 5.20 g/dL Final   • ALT (SGPT) 12/05/2017 22  0 - 33 U/L Final   • AST (SGOT) 12/05/2017 27  0 - 32 U/L Final   • Alkaline Phosphatase 12/05/2017 73  38 - 116 U/L Final   • Total Bilirubin 12/05/2017 0.9  0.1 - 1.2 mg/dL Final   • eGFR Non African Amer 12/05/2017 87  >60 mL/min/1.73 Final   • Globulin 12/05/2017 2.5  1.8 - 3.5 gm/dL Final   • A/G Ratio 12/05/2017 1.7  1.1 - 2.4 g/dL Final   • BUN/Creatinine Ratio 12/05/2017 19.4  7.3 - 30.0 Final   • Anion Gap 12/05/2017 12.8  mmol/L Final   • CA 19-9 12/05/2017 9.9  <=35.0 U/mL Final   • WBC 12/05/2017 4.23  4.00 - 10.00 10*3/mm3 Final   • RBC 12/05/2017 4.28  3.90 - 5.00 10*6/mm3 Final   • Hemoglobin 12/05/2017 13.5  11.5 - 14.9 g/dL Final   • Hematocrit 12/05/2017 41.8  34.0 - 45.0 % Final   • MCV 12/05/2017 97.7* 83.0 - 97.0 fL Final   • MCH 12/05/2017 31.5  27.0 - 33.0 pg Final   • MCHC 12/05/2017 32.3  32.0 - 35.0 g/dL Final   • RDW 12/05/2017 12.7  11.7 - 14.5 % Final   • RDW-SD 12/05/2017 45.7  37.0 - 49.0 fl Final   • MPV 12/05/2017 11.0  8.9 - 12.1 fL Final   • Platelets 12/05/2017 211  150 - 375 10*3/mm3 Final   • Neutrophil % 12/05/2017 56.0  39.0 - 75.0 % Final   • Lymphocyte % 12/05/2017 29.1  20.0 - 49.0 % Final   • Monocyte % 12/05/2017 9.5  4.0 - 12.0 % Final   • Eosinophil % 12/05/2017 4.0  1.0 - 5.0 % Final   • Basophil % 12/05/2017 1.2* 0.0 - 1.1 % Final   • Immature Grans % 12/05/2017 0.2  0.0 - 0.5 % Final   • Neutrophils, Absolute 12/05/2017 2.37  1.50 - 7.00 10*3/mm3 Final   • Lymphocytes, Absolute 12/05/2017 1.23  1.00 - 3.50 10*3/mm3 Final   • Monocytes, Absolute 12/05/2017 0.40  0.25 - 0.80 10*3/mm3 Final   • Eosinophils, Absolute 12/05/2017 0.17  0.00 - 0.36 10*3/mm3 Final   • Basophils, Absolute 12/05/2017 0.05  0.00 - 0.10 10*3/mm3 Final   • Immature Grans, Absolute 12/05/2017 0.01  0.00 - 0.03 10*3/mm3 Final   • nRBC 12/05/2017 0.0  0.0 - 0.0 /100 WBC Final       Lanse was seen today for pancreatic cyst and alopecia.    Diagnoses  and all orders for this visit:    Pancreatic mass  -     Ambulatory Referral to General Surgery      Patient 72-year-old female with history of pancreatic mass workup so far reveals the lesion to be benign but space-occupying and obstructing the distal duct.  Patient's lesion is in the distal one half of the pancreas towards the tail.  Seen by Dr. Maravilla felt to be limited by a resection causing diabetes.  Would recommend second opinion will ask Dr. Hall who specializes in pancreaticobiliary surgery for possible evaluation for distal pancreatectomy.  Await consult for further recommendations.  Would continue current medications particularly spots 2 Creon and follow-up clinically.

## 2018-02-27 ENCOUNTER — APPOINTMENT (OUTPATIENT)
Dept: PET IMAGING | Facility: HOSPITAL | Age: 73
End: 2018-02-27
Attending: INTERNAL MEDICINE

## 2018-03-06 ENCOUNTER — APPOINTMENT (OUTPATIENT)
Dept: LAB | Facility: HOSPITAL | Age: 73
End: 2018-03-06
Attending: INTERNAL MEDICINE

## 2018-05-29 ENCOUNTER — OFFICE VISIT (OUTPATIENT)
Dept: GASTROENTEROLOGY | Facility: CLINIC | Age: 73
End: 2018-05-29

## 2018-05-29 VITALS
WEIGHT: 155 LBS | BODY MASS INDEX: 23.49 KG/M2 | SYSTOLIC BLOOD PRESSURE: 112 MMHG | DIASTOLIC BLOOD PRESSURE: 72 MMHG | TEMPERATURE: 98.2 F | HEIGHT: 68 IN

## 2018-05-29 DIAGNOSIS — K86.89 PANCREATIC MASS: Primary | ICD-10-CM

## 2018-05-29 DIAGNOSIS — D37.9 NEOPLASM OF UNCERTAIN BEHAVIOR OF DIGESTIVE ORGAN: ICD-10-CM

## 2018-05-29 PROCEDURE — 99213 OFFICE O/P EST LOW 20 MIN: CPT | Performed by: INTERNAL MEDICINE

## 2018-05-29 NOTE — PROGRESS NOTES
Chief Complaint   Patient presents with   • pancreatic mass       Elsie Burleson is a  73 y.o. female here for a follow up visit for Pancreatic mass.    HPI    Patient 73-year-old female with history of GERD, hypercholesterolemia and osteoarthritis presenting with pancreatic cystic mass.  Repeat pathology on fine-needle aspirate was nondiagnostic and chemistries sent to the aspirate were consistent with benign lesion.  Review of results of MRI from March show no change in the cyst size.  Patient reports since starting on Creon is doing well, did decrease the dose and started symptoms against is back to taking the Creon 4 times a day.  Discussed with patient that the Creon should be taken with meals and if she is not having a snack does not need the fourth tablet a day.  Patient otherwise doing well with stable weight.    Past Medical History:   Diagnosis Date   • Abdominal pain    • Anxiety    • Degenerative spondylolisthesis, L4-5    • GERD (gastroesophageal reflux disease)    • Glaucoma    • Greater trochanteric bursitis     left   • High cholesterol    • History of chicken pox     Childhood   • History of diverticulosis    • History of foreign travel 02/2017    [place]ve[place]    • History of infectious mononucleosis    • History of mitral valve prolapse    • Infectious viral hepatitis 1976    A   • Irregular heart beat     ON MED SEVERAL YEARS   • Low back pain     GETTING EPIDURALS, DR CHAPA   • Osteoarthritis     Spine w/radiculopathy, lumbar region   • Peptic ulceration    • PONV (postoperative nausea and vomiting)    • Restless leg    • S/P epidural steroid injection    • Sleep apnea with use of continuous positive airway pressure (CPAP)    • Umbilical hernia          Current Outpatient Prescriptions:   •  aspirin 81 MG EC tablet, Take 81 mg by mouth Daily. PT TO HOLD MED PRIOR TO OR, Disp: , Rfl:   •  Coenzyme Q10-Levocarnitine 100-20 MG capsule, Take 1 tablet by mouth Daily. PT TO HOLD MED PRIOR TO OR,  Disp: , Rfl:   •  EPINEPHRINE IJ, Inject  as directed As Needed (pt has epi pen)., Disp: , Rfl:   •  esomeprazole (nexIUM) 20 MG capsule, Take 20 mg by mouth 2 (Two) Times a Day., Disp: , Rfl:   •  Ezetimibe (ZETIA PO), Take 10 mg by mouth 2 (Two) Times a Day., Disp: , Rfl:   •  fluticasone (FLONASE) 50 MCG/ACT nasal spray, 1 spray into each nostril Every Evening., Disp: , Rfl:   •  Glucosamine-Chondroitin 250-200 MG capsule, Take 1 capsule by mouth Daily., Disp: , Rfl:   •  LORazepam (ATIVAN) 0.5 MG tablet, Take 0.5 mg by mouth Every 8 (Eight) Hours As Needed for Anxiety., Disp: , Rfl:   •  montelukast (SINGULAIR) 10 MG tablet, Take 10 mg by mouth Every Night., Disp: , Rfl:   •  Nutritional Supplements (JUICE PLUS FIBRE PO), Take 2 tablets by mouth Daily., Disp: , Rfl:   •  Pancrelipase, Lip-Prot-Amyl, 19259 units capsule delayed-release particles, Take 36,000 mg by mouth 4 (Four) Times a Day. With meals and snack, Disp: 360 capsule, Rfl: 3  •  rosuvastatin (CRESTOR) 5 MG tablet, Take 5 mg by mouth. MONDAYS, WEDNESDAYS AND FRIDAYS, Disp: , Rfl:   •  Unable to find, Take 1 each by mouth Daily. RELIEF FACTOR- 4 CAPSULES DAILY PT TO HOLD MED PRIOR TO OR, Disp: , Rfl:   •  verapamil SR (CALAN-SR) 180 MG CR tablet, Take 180 mg by mouth Every Night., Disp: , Rfl:   •  omeprazole (priLOSEC) 40 MG capsule, Take 40 mg by mouth 2 (Two) Times a Day., Disp: , Rfl:     Allergies   Allergen Reactions   • Ceclor [Cefaclor] Anaphylaxis and Swelling     THROAT SWELLING   • Cherry Anaphylaxis     DIFFICULTY BREATHING   • Bee Venom Hives       Social History     Social History   • Marital status:      Spouse name: Ellis   • Number of children: 2   • Years of education: College     Occupational History   •  Retired     Social History Main Topics   • Smoking status: Former Smoker     Packs/day: 0.50     Types: Cigarettes     Start date: 1963     Quit date: 1995   • Smokeless tobacco: Never Used      Comment: QUIT 22 YEARS  AGO//25 yrs, 1 ppd   • Alcohol use 0.6 oz/week     1 Glasses of wine per week      Comment: per day   • Drug use: No   • Sexual activity: Defer     Other Topics Concern   • Not on file     Social History Narrative   • No narrative on file       Family History   Problem Relation Age of Onset   • Heart disease Mother    • Hyperlipidemia Mother    • Stroke Mother    • Hypertension Mother    • Heart disease Father    • Heart disease Brother    • Arthritis Brother    • Hyperlipidemia Brother    • Stroke Brother    • Hypertension Brother    • Malig Hyperthermia Neg Hx        Review of Systems   Constitutional: Negative.    Respiratory: Negative.    Cardiovascular: Negative.    Gastrointestinal: Negative.    Musculoskeletal: Negative.    Skin: Negative.    Hematological: Negative.        Vitals:    05/29/18 1023   BP: 112/72   Temp: 98.2 °F (36.8 °C)       Physical Exam   Constitutional: She is oriented to person, place, and time. She appears well-developed and well-nourished.   HENT:   Head: Normocephalic and atraumatic.   Eyes: Pupils are equal, round, and reactive to light. No scleral icterus.   Cardiovascular: Exam reveals no gallop and no friction rub.    No murmur heard.  Pulmonary/Chest: She has no wheezes. She has no rales.   Abdominal: Soft. Bowel sounds are normal. She exhibits no distension and no mass. There is no tenderness. No hernia.   Neurological: She is alert and oriented to person, place, and time.   Skin: Skin is warm and dry. No rash noted.   Psychiatric: She has a normal mood and affect. Her behavior is normal.   Vitals reviewed.      No visits with results within 2 Month(s) from this visit.   Latest known visit with results is:   Hospital Outpatient Visit on 12/05/2017   Component Date Value Ref Range Status   • Creatinine 12/05/2017 0.70  0.60 - 1.30 mg/dL Final       Lena was seen today for pancreatic mass.    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  Patient reports as long as she takes the Creon her nausea is under control.  Patient tried decreasing her Creon to twice a day but her symptoms quickly return.  Patient noted with special chemistries of the cyst aspirate negative for malignancy.  Patient's repeat MRI in March was negative as well.  Patient is due for repeat CA-19-9 will send that today and follow clinically.

## 2018-05-30 LAB — CANCER AG19-9 SERPL-ACNC: 9 U/ML (ref 0–35)

## 2018-07-11 ENCOUNTER — TELEPHONE (OUTPATIENT)
Dept: GASTROENTEROLOGY | Facility: CLINIC | Age: 73
End: 2018-07-11

## 2018-07-11 NOTE — TELEPHONE ENCOUNTER
Patient called, advised her Tumor Marker remains normal and to f/u in 6 months, according to Dr. Orlando. Patient states she is on PTO and will call back to set up a f/u appointment.

## 2018-07-11 NOTE — TELEPHONE ENCOUNTER
----- Message from Roby Orlando MD sent at 7/8/2018 12:24 PM EDT -----  Tumor marker remains  Normal.  F/U 6 months

## 2018-07-19 ENCOUNTER — TELEPHONE (OUTPATIENT)
Dept: GASTROENTEROLOGY | Facility: CLINIC | Age: 73
End: 2018-07-19

## 2018-07-19 NOTE — TELEPHONE ENCOUNTER
Called pt and advised of Dr Orlando's note. She verb understanding and states she will not take the chance on the med that can be bought offline. She states she is feeling good for the first time in a long time and will not chance it.

## 2018-07-19 NOTE — TELEPHONE ENCOUNTER
I don't know that particular brand but I will say fats a few years ago pancreatic enzyme replacements were pulled off the market and prescription because the prescription versions could not show active formulation.  The drug apparently they were selling was inert and they had to re-document to get approval that the drug was actually active ingredients.  She can certainly try this over the counter but I would be a little bit cautious as to whether there was effective drug therapy.

## 2018-07-19 NOTE — TELEPHONE ENCOUNTER
"Called pt back. Pt states she is in the \"donut hole\" with insurance and last time she was seen, Dr Orlando gave her some samples to try and help her through. She states she paid for a month of Creon ($300.00) then got samples and now it's time to pay for another month or get more samples. She was hoping to get more samples. Also, a friend of hers told her about possibly buying a medication from online very similar to Creon called \"Pure Pancreatic Enzyme\" from a company called [employer]. Advised I looked up the information online and the med is similar to Creon but not exact. Advised that Creon has the ingredients: Lipase 36,000 protease 114,000 and amylase 180,000. Advised that the med she is asking about has the same ingredients but at different doses: lipase 17,5000 protease 110,000 and amylase 120,000. Advised will leave samples of Creon at the  for her and then ask Dr Orlando about possibly buying this med offline. Pt verb understanding.   1 bottle of 100 capsules of Creon 36,000 left at  for pt.   "

## 2018-07-19 NOTE — TELEPHONE ENCOUNTER
----- Message from Elisabeth Fowler sent at 7/19/2018 11:08 AM EDT -----  Regarding: PT CALLED - QUESTIONS REGARDING CREON  Contact: [phone]  DO WE HAVE ANY SAMPLES? PT IS NOW IN THE DOUGHNUT HOLE ON HER INS. SHE ALSO ASK ABOUT A PURE PANCREATIC ENZYME FORMULA WHICH HAS SAME INGREDIENTS AS CREON. THE COMPANY  IS [employer].

## 2018-08-20 ENCOUNTER — APPOINTMENT (OUTPATIENT)
Dept: SLEEP MEDICINE | Facility: HOSPITAL | Age: 73
End: 2018-08-20
Attending: INTERNAL MEDICINE

## 2018-09-17 ENCOUNTER — OFFICE VISIT (OUTPATIENT)
Dept: SLEEP MEDICINE | Facility: HOSPITAL | Age: 73
End: 2018-09-17
Attending: INTERNAL MEDICINE

## 2018-09-17 VITALS
DIASTOLIC BLOOD PRESSURE: 69 MMHG | BODY MASS INDEX: 23.49 KG/M2 | HEART RATE: 72 BPM | SYSTOLIC BLOOD PRESSURE: 110 MMHG | HEIGHT: 68 IN | OXYGEN SATURATION: 95 % | WEIGHT: 155 LBS

## 2018-09-17 DIAGNOSIS — J30.9 CHRONIC ALLERGIC RHINITIS, UNSPECIFIED SEASONALITY, UNSPECIFIED TRIGGER: ICD-10-CM

## 2018-09-17 DIAGNOSIS — Z99.89 OSA ON CPAP: Primary | ICD-10-CM

## 2018-09-17 DIAGNOSIS — G47.33 OSA ON CPAP: Primary | ICD-10-CM

## 2018-09-17 DIAGNOSIS — G47.19 EXCESSIVE DAYTIME SLEEPINESS: ICD-10-CM

## 2018-09-17 DIAGNOSIS — R00.2 PALPITATIONS: ICD-10-CM

## 2018-09-17 PROCEDURE — G0463 HOSPITAL OUTPT CLINIC VISIT: HCPCS

## 2018-09-17 RX ORDER — LATANOPROST 50 UG/ML
1 SOLUTION/ DROPS OPHTHALMIC
COMMUNITY
End: 2019-09-06

## 2018-09-17 RX ORDER — CALCIUM POLYCARBOPHIL 625 MG 625 MG/1
625 TABLET ORAL 4 TIMES DAILY
COMMUNITY

## 2018-09-17 RX ORDER — ADHESIVE TAPE 3"X 2.3 YD
2 TAPE, NON-MEDICATED TOPICAL DAILY
COMMUNITY

## 2018-09-17 RX ORDER — ASPIRIN 81 MG/1
81 TABLET ORAL
COMMUNITY
End: 2019-06-25

## 2018-09-17 RX ORDER — ALPRAZOLAM 0.5 MG/1
0.5 TABLET ORAL 3 TIMES DAILY PRN
COMMUNITY

## 2018-09-17 NOTE — PROGRESS NOTES
Sleep Disorder Follow Up    Patient Name: Elsie Burleson  Age/Sex: 73 y.o. female  : 1945  MRN: [id number]    Date of Encounter Visit: 18  Referring Provider: Meenakshi Andino MD  Place of Service: [facility]  Patient Care Team:  Zoran Piedra MD as PCP - General  Zoran Piedra MD as PCP - Family Medicine  Zoran Piedra MD as PCP - Claims Attributed  Zoran Piedra MD as Referring Physician (Internal Medicine)  Enmanuel Lopez MD PhD as Consulting Physician (Hematology and Oncology)    PROBLEM LIST:  1. Mild obstructive sleep apnea  2. Excessive daytime sleepiness  3. Palpitations   4. Allergic rhinitis    History of Present Illness:  Elsie Burleson is a 73 y.o. female who is here for follow up on GISSELLE and daytime sleepiness.     Patient last seen in the office on 17 and no changes were made.  She was diagnosed with moderate sleep apnea in  by sleep say that showed an AHI of 10.3 and RDI of 11.7.  She was treated with CPAP at a pressure of 7 cm H2O.      Since last visit, she denies any significant new medical issues, but was recently prescribed linzess for irritable bowel syndrome..  Patient uses machine every night with no complaints from the mask or the pressure.  Patient uses a nasal pillow mask, which does fit well. Patient denies any air leak or dry mouth.   Patient's equipment supplier is [employer] and last mask replacement was about 3 months ago.  Patient sleeps better and has a deeper sleep with the machine and feels more energy during the day time.  Currently on CPAP at 7 cm H20.  Geff Sleepiness Scale (ESS) is 5.  Compliance download was reviewed and documented below.  Weight is down 1 pound since last visit.  Other comorbidities include palpitations.     Review of Systems:   A ten-system review was conducted and was negative.   Please refer to the follow up sleep questionnaire page one for details.    Past Medical History:  Past medical,  surgical, social, and family history, except as mentioned above, was unchanged from the last visit.     Past Medical History:   Diagnosis Date   • Abdominal pain    • Anxiety    • Degenerative spondylolisthesis, L4-5    • GERD (gastroesophageal reflux disease)    • Glaucoma    • Greater trochanteric bursitis     left   • High cholesterol    • History of chicken pox     Childhood   • History of diverticulosis    • History of foreign travel 02/2017    Parveen    • History of infectious mononucleosis    • History of mitral valve prolapse    • Infectious viral hepatitis 1976    A   • Irregular heart beat     ON MED SEVERAL YEARS   • Low back pain     GETTING EPIDURALS, DR CHAPA   • Osteoarthritis     Spine w/radiculopathy, lumbar region   • Peptic ulceration    • PONV (postoperative nausea and vomiting)    • Restless leg    • S/P epidural steroid injection    • Sleep apnea with use of continuous positive airway pressure (CPAP)    • Umbilical hernia        Past Surgical History:   Procedure Laterality Date   • APPENDECTOMY  1964   • BLADDER SURGERY  2012   • BUNIONECTOMY Bilateral    • CATARACT EXTRACTION Bilateral    • COLONOSCOPY     • ENDOSCOPY  11/16/2017    EUS-  Mass in pancreatic body   • GLAUCOMA SURGERY Bilateral    • HAMMER TOE REPAIR Right 2000   • HYSTERECTOMY  1992   • SEN'S NEUROMA EXCISION Left 2012   • PELVIC FLOOR REPAIR     • SHOULDER ARTHROSCOPY Right 09/25/2014    With rotator cuff repair 2 x 1.5 cm, subacromial decompression w/acromioplasty, debridement of labral tearing and biceps tenotomy and chondroplasty   • SKIN BIOPSY     • TONSILLECTOMY  1963   • UMBILICAL HERNIA REPAIR N/A 7/17/2017    Procedure: UMBILICAL HERNIA REPAIR;  Surgeon: Haseeb Monterroso Jr., MD;  Location: [facility];  Service:        Home Medications:     Current Outpatient Prescriptions:   •  Triamcinolone Acet & Lidocaine 20-20 MG/4ML suspension, triamcinolone 0.1 % topical ointment and dimethicone 5 % topical cream  2  x daily around nails, Disp: , Rfl:   •  ALPRAZolam (XANAX) 0.5 MG tablet, Take 0.5 mg by mouth., Disp: , Rfl:   •  aspirin 81 MG EC tablet, Take 81 mg by mouth., Disp: , Rfl:   •  calcium polycarbophil (FIBERCON) 625 MG tablet, Take 625 mg by mouth., Disp: , Rfl:   •  Coenzyme Q10-Levocarnitine 100-20 MG capsule, Take 1 tablet by mouth Daily. PT TO HOLD MED PRIOR TO OR, Disp: , Rfl:   •  EPINEPHRINE IJ, Inject  as directed As Needed (pt has epi pen)., Disp: , Rfl:   •  esomeprazole (nexIUM) 20 MG capsule, Take 20 mg by mouth 2 (Two) Times a Day., Disp: , Rfl:   •  Ezetimibe (ZETIA PO), Take 10 mg by mouth 2 (Two) Times a Day., Disp: , Rfl:   •  fluticasone (FLONASE) 50 MCG/ACT nasal spray, 1 spray into each nostril Every Evening., Disp: , Rfl:   •  Glucosamine-Chondroitin 250-200 MG capsule, Take 1 capsule by mouth Daily., Disp: , Rfl:   •  latanoprost (XALATAN) 0.005 % ophthalmic solution, 1 drop., Disp: , Rfl:   •  linaclotide (LINZESS) 290 MCG capsule capsule, Linzess 290 mcg capsule  Take 1 capsule every day by oral route., Disp: , Rfl:   •  LORazepam (ATIVAN) 0.5 MG tablet, Take 0.5 mg by mouth Every 8 (Eight) Hours As Needed for Anxiety., Disp: , Rfl:   •  Magnesium Oxide 200 MG tablet, Take 1 tablet by mouth., Disp: , Rfl:   •  montelukast (SINGULAIR) 10 MG tablet, Take 10 mg by mouth Every Night., Disp: , Rfl:   •  Nutritional Supplements (JUICE PLUS FIBRE PO), Take 2 tablets by mouth Daily., Disp: , Rfl:   •  Nutritional Supplements (JUICE PLUS FIBRE PO), Take 1 can by mouth., Disp: , Rfl:   •  Pancrelipase, Lip-Prot-Amyl, 51730 units capsule delayed-release particles, Take 36,000 mg by mouth 4 (Four) Times a Day. With meals and snack, Disp: 360 capsule, Rfl: 3  •  Propylene Glycol 0.6 % solution, 1 mL., Disp: , Rfl:   •  rosuvastatin (CRESTOR) 5 MG tablet, Take 5 mg by mouth. MONDAYS, WEDNESDAYS AND FRIDAYS, Disp: , Rfl:   •  Unable to find, Take 1 each by mouth Daily. RELIEF FACTOR- 4 CAPSULES DAILY PT TO  "HOLD MED PRIOR TO OR, Disp: , Rfl:   •  verapamil SR (CALAN-SR) 180 MG CR tablet, Take 180 mg by mouth Every Night., Disp: , Rfl:     Allergies:  Allergies   Allergen Reactions   • Ceclor [Cefaclor] Anaphylaxis and Swelling     THROAT SWELLING   • Cherry Anaphylaxis     DIFFICULTY BREATHING   • Bee Venom Hives       Past Social History:  Social History     Social History   • Marital status:      Spouse name: Ellis   • Number of children: 2   • Years of education: College     Occupational History   •  Retired     Social History Main Topics   • Smoking status: Former Smoker     Packs/day: 0.50     Types: Cigarettes     Start date: 1963     Quit date: 1995   • Smokeless tobacco: Never Used      Comment: QUIT 22 YEARS AGO//25 yrs, 1 ppd   • Alcohol use 0.6 oz/week     1 Glasses of wine per week      Comment: per day   • Drug use: No   • Sexual activity: Defer     Other Topics Concern   • Not on file       Past Family History:  Family History   Problem Relation Age of Onset   • Heart disease Mother    • Hyperlipidemia Mother    • Stroke Mother    • Hypertension Mother    • Heart disease Father    • Heart disease Brother    • Arthritis Brother    • Hyperlipidemia Brother    • Stroke Brother    • Hypertension Brother    • Malig Hyperthermia Neg Hx          Objective:   Done and documented on sleep disorders center physical examination sheet, please refer to hand written note on the chart for details about the other pertinent negative findings.    Vital Signs:   Visit Vitals  /69 (BP Location: Left arm, Patient Position: Sitting)   Pulse 72   Ht 172.7 cm (68\")   Wt 70.3 kg (155 lb)   SpO2 95%   BMI 23.57 kg/m²     Wt Readings from Last 3 Encounters:   09/17/18 70.3 kg (155 lb)   05/29/18 70.3 kg (155 lb)   02/06/18 70.8 kg (156 lb)     Neck Circumference: 14 inches    Physical Exam:   General: AAOx3. Normal mood and affect.   HEENT:  Moist mucous membranes.  Septum midline. Mallampati 2 airway.   LUNGS: " Non-labored breathing. CTAB. No wheezes.  HEART: Regular rate and rhythm. No murmur. Normal s1/s2  EXTREMITIES: no edema. No cyanosis or clubbing. Normal gait    Diagnostic Data:  Sleep study done in 2011 showed AHI of 10.3, RDI 11.7 on CPAP of 7.    Compliance download from 8/18/18 through 9/16/18 showed 100% compliant with allergies to 6 hours and 40 minutes.  Residual AHI 0.3 on CPAP at 7 cm H2O with 0 seconds of average time with large air leak.      Assessment and Plan:       ICD-10-CM ICD-9-CM   1. GISSELLE on CPAP G47.33 327.23    Z99.89 V46.8   2. Chronic allergic rhinitis, unspecified seasonality, unspecified trigger J30.9 477.9   3. Excessive daytime sleepiness G47.19 780.54   4. Palpitations R00.2 785.1       Recommendations:     Patient is compliant with PAP therapy and has good control of GISSELLE with clinical and subjective improvement and will continue CPAP at 7 cm H2O and renew supplies.    Encouraged to clean mask and replace supplies as indicated.    Continue Singulair.    Time sleepiness is well controlled at this point in time.  Continue CPAP.    Eyes any recent palpitations.  Avoid excess caffeine.    No orders of the defined types were placed in this encounter.    No orders of the defined types were placed in this encounter.    Return in about 1 year (around 9/17/2019).    NAGI Ortiz   [facility]   09/17/18  2:54 PM    Dictated utilizing Dragon dictation

## 2019-05-14 ENCOUNTER — TRANSCRIBE ORDERS (OUTPATIENT)
Dept: CARDIOLOGY | Facility: CLINIC | Age: 74
End: 2019-05-14

## 2019-05-14 DIAGNOSIS — R53.83 FATIGUE, UNSPECIFIED TYPE: ICD-10-CM

## 2019-05-14 DIAGNOSIS — R07.9 CHEST PAIN, UNSPECIFIED TYPE: Primary | ICD-10-CM

## 2019-05-23 ENCOUNTER — HOSPITAL ENCOUNTER (OUTPATIENT)
Dept: CARDIOLOGY | Facility: HOSPITAL | Age: 74
Discharge: HOME | End: 2019-05-23
Admitting: INTERNAL MEDICINE

## 2019-05-23 DIAGNOSIS — R07.9 CHEST PAIN, UNSPECIFIED TYPE: ICD-10-CM

## 2019-05-23 DIAGNOSIS — R53.83 FATIGUE, UNSPECIFIED TYPE: ICD-10-CM

## 2019-05-23 LAB
BH CV NUCLEAR PRIOR STUDY: 2
BH CV STRESS BP STAGE 1: NORMAL
BH CV STRESS BP STAGE 2: NORMAL
BH CV STRESS DURATION MIN STAGE 1: 3
BH CV STRESS DURATION MIN STAGE 2: 2
BH CV STRESS DURATION SEC STAGE 1: 0
BH CV STRESS DURATION SEC STAGE 2: 30
BH CV STRESS GRADE STAGE 1: 10
BH CV STRESS GRADE STAGE 2: 12
BH CV STRESS HR STAGE 1: 125
BH CV STRESS HR STAGE 2: 156
BH CV STRESS METS STAGE 1: 5
BH CV STRESS METS STAGE 2: 7.5
BH CV STRESS PROTOCOL 1: NORMAL
BH CV STRESS RECOVERY BP: NORMAL MMHG
BH CV STRESS RECOVERY HR: 82 BPM
BH CV STRESS SPEED STAGE 1: 1.7
BH CV STRESS SPEED STAGE 2: 2.5
BH CV STRESS STAGE 1: 1
BH CV STRESS STAGE 2: 2
LV EF NUC BP: 59 %
MAXIMAL PREDICTED HEART RATE: 146 BPM
PERCENT MAX PREDICTED HR: 106.85 %
STRESS BASELINE BP: NORMAL MMHG
STRESS BASELINE HR: 69 BPM
STRESS PERCENT HR: 126 %
STRESS POST ESTIMATED WORKLOAD: 7 METS
STRESS POST EXERCISE DUR MIN: 5 MIN
STRESS POST EXERCISE DUR SEC: 30 SEC
STRESS POST PEAK BP: NORMAL MMHG
STRESS POST PEAK HR: 156 BPM
STRESS TARGET HR: 124 BPM

## 2019-05-23 PROCEDURE — 93018 CV STRESS TEST I&R ONLY: CPT | Performed by: INTERNAL MEDICINE

## 2019-05-23 PROCEDURE — 93016 CV STRESS TEST SUPVJ ONLY: CPT | Performed by: INTERNAL MEDICINE

## 2019-05-23 PROCEDURE — A9502 TC99M TETROFOSMIN: HCPCS | Performed by: INTERNAL MEDICINE

## 2019-05-23 PROCEDURE — 78452 HT MUSCLE IMAGE SPECT MULT: CPT | Performed by: INTERNAL MEDICINE

## 2019-05-23 PROCEDURE — 78452 HT MUSCLE IMAGE SPECT MULT: CPT

## 2019-05-23 PROCEDURE — 93017 CV STRESS TEST TRACING ONLY: CPT

## 2019-05-23 PROCEDURE — 0 TECHNETIUM TETROFOSMIN KIT: Performed by: INTERNAL MEDICINE

## 2019-05-23 RX ADMIN — TETROFOSMIN 1 DOSE: 1.38 INJECTION, POWDER, LYOPHILIZED, FOR SOLUTION INTRAVENOUS at 08:22

## 2019-05-23 RX ADMIN — TETROFOSMIN 1 DOSE: 1.38 INJECTION, POWDER, LYOPHILIZED, FOR SOLUTION INTRAVENOUS at 09:10

## 2019-06-06 ENCOUNTER — TRANSCRIBE ORDERS (OUTPATIENT)
Dept: ADMINISTRATIVE | Facility: HOSPITAL | Age: 74
End: 2019-06-06

## 2019-06-06 DIAGNOSIS — M54.50 LUMBAR PAIN: Primary | ICD-10-CM

## 2019-06-11 ENCOUNTER — OFFICE VISIT (OUTPATIENT)
Dept: GASTROENTEROLOGY | Facility: CLINIC | Age: 74
End: 2019-06-11

## 2019-06-11 VITALS
BODY MASS INDEX: 24.64 KG/M2 | TEMPERATURE: 97.9 F | WEIGHT: 162.6 LBS | SYSTOLIC BLOOD PRESSURE: 108 MMHG | DIASTOLIC BLOOD PRESSURE: 62 MMHG | HEIGHT: 68 IN

## 2019-06-11 DIAGNOSIS — D37.9 NEOPLASM OF UNCERTAIN BEHAVIOR OF DIGESTIVE ORGAN: ICD-10-CM

## 2019-06-11 DIAGNOSIS — K86.89 PANCREATIC MASS: Primary | ICD-10-CM

## 2019-06-11 PROCEDURE — 99212 OFFICE O/P EST SF 10 MIN: CPT | Performed by: INTERNAL MEDICINE

## 2019-06-11 NOTE — PROGRESS NOTES
Chief Complaint   Patient presents with   • Follow-up   • Med Refill     Creon   • pancreatic cyst       Elsie Burleson is a  74 y.o. female here for a follow up visit for cystic mass in the pancreas.    HPI     Patient 74-year-old female with history of peptic ulcer disease, GERD and abdominal pain with cystic lesion in the pancreas here for follow-up.  Patient reports as long as she takes her Creon she does well.  Patient noted return to [facility] where she was told by surgery she might not need the Creon.  Patient did however come off the Creon and noted marked worsening of her symptoms now back on Creon doing better.    Past Medical History:   Diagnosis Date   • Abdominal pain    • Anxiety    • Degenerative spondylolisthesis, L4-5    • GERD (gastroesophageal reflux disease)    • Glaucoma    • Greater trochanteric bursitis     left   • High cholesterol    • History of chicken pox     Childhood   • History of diverticulosis    • History of foreign travel 02/2017    Parveen    • History of infectious mononucleosis    • History of mitral valve prolapse    • Infectious viral hepatitis 1976    A   • Irregular heart beat     ON MED SEVERAL YEARS   • Low back pain     GETTING EPIDURALS, DR CHAPA   • Osteoarthritis     Spine w/radiculopathy, lumbar region   • Peptic ulceration    • PONV (postoperative nausea and vomiting)    • Restless leg    • S/P epidural steroid injection    • Sleep apnea with use of continuous positive airway pressure (CPAP)    • Umbilical hernia          Current Outpatient Medications:   •  ALPRAZolam (XANAX) 0.5 MG tablet, Take 0.5 mg by mouth., Disp: , Rfl:   •  aspirin 81 MG EC tablet, Take 81 mg by mouth., Disp: , Rfl:   •  calcium polycarbophil (FIBERCON) 625 MG tablet, Take 625 mg by mouth., Disp: , Rfl:   •  Coenzyme Q10-Levocarnitine 100-20 MG capsule, Take 1 tablet by mouth Daily. PT TO HOLD MED PRIOR TO OR, Disp: , Rfl:   •  EPINEPHRINE IJ, Inject  as directed As Needed (pt  has epi pen)., Disp: , Rfl:   •  esomeprazole (nexIUM) 20 MG capsule, Take 20 mg by mouth Daily., Disp: , Rfl:   •  Ezetimibe (ZETIA PO), Take 10 mg by mouth 2 (Two) Times a Day., Disp: , Rfl:   •  fluticasone (FLONASE) 50 MCG/ACT nasal spray, 1 spray into each nostril Every Evening., Disp: , Rfl:   •  Glucosamine-Chondroitin 250-200 MG capsule, Take 1 capsule by mouth Daily., Disp: , Rfl:   •  latanoprost (XALATAN) 0.005 % ophthalmic solution, 1 drop., Disp: , Rfl:   •  linaclotide (LINZESS) 290 MCG capsule capsule, Linzess 290 mcg capsule  Take 1 capsule every day by oral route., Disp: , Rfl:   •  LORazepam (ATIVAN) 0.5 MG tablet, Take 0.5 mg by mouth Every 8 (Eight) Hours As Needed for Anxiety., Disp: , Rfl:   •  Magnesium Oxide 200 MG tablet, Take 1 tablet by mouth., Disp: , Rfl:   •  montelukast (SINGULAIR) 10 MG tablet, Take 10 mg by mouth Every Night., Disp: , Rfl:   •  Nutritional Supplements (JUICE PLUS FIBRE PO), Take 2 tablets by mouth Daily., Disp: , Rfl:   •  Nutritional Supplements (JUICE PLUS FIBRE PO), Take 1 can by mouth., Disp: , Rfl:   •  Pancrelipase, Lip-Prot-Amyl, (CREON) 18975 units capsule delayed-release particles, Take 1 capsule by mouth four times daily, before meals and with a snack., Disp: 360 capsule, Rfl: 3  •  Propylene Glycol 0.6 % solution, 1 mL., Disp: , Rfl:   •  rosuvastatin (CRESTOR) 5 MG tablet, Take 5 mg by mouth. MONDAYS, WEDNESDAYS AND FRIDAYS, Disp: , Rfl:   •  Triamcinolone Acet & Lidocaine 20-20 MG/4ML suspension, triamcinolone 0.1 % topical ointment and dimethicone 5 % topical cream  2 x daily around nails, Disp: , Rfl:   •  Unable to find, Take 1 each by mouth Daily. RELIEF FACTOR- 4 CAPSULES DAILY PT TO HOLD MED PRIOR TO OR, Disp: , Rfl:   •  verapamil SR (CALAN-SR) 180 MG CR tablet, Take 180 mg by mouth Every Night., Disp: , Rfl:     Allergies   Allergen Reactions   • Ceclor [Cefaclor] Anaphylaxis and Swelling     THROAT SWELLING   • Cherry Anaphylaxis     DIFFICULTY  BREATHING   • Brimonidine Tartrate-Timolol Other (See Comments)   • Bee Venom Hives       Social History     Socioeconomic History   • Marital status:      Spouse name: Ellis   • Number of children: 2   • Years of education: College   • Highest education level: Not on file   Occupational History     Employer: RETIRED   Tobacco Use   • Smoking status: Former Smoker     Packs/day: 0.50     Types: Cigarettes     Start date:      Last attempt to quit:      Years since quittin.4   • Smokeless tobacco: Never Used   • Tobacco comment: QUIT 22 YEARS AGO//25 yrs, 1 ppd   Substance and Sexual Activity   • Alcohol use: Yes     Alcohol/week: 0.6 oz     Types: 1 Glasses of wine per week     Comment: per day   • Drug use: No   • Sexual activity: Defer       Family History   Problem Relation Age of Onset   • Heart disease Mother    • Hyperlipidemia Mother    • Stroke Mother    • Hypertension Mother    • Heart disease Father    • Heart disease Brother    • Arthritis Brother    • Hyperlipidemia Brother    • Stroke Brother    • Hypertension Brother    • Malig Hyperthermia Neg Hx        Review of Systems   Constitutional: Negative.    Respiratory: Negative.    Cardiovascular: Negative.    Gastrointestinal: Positive for nausea. Negative for abdominal distention, abdominal pain, anal bleeding, blood in stool, constipation, diarrhea, rectal pain and vomiting.   Musculoskeletal: Negative.    Skin: Negative.    Hematological: Negative.        Vitals:    19 1342   BP: 108/62   Temp: 97.9 °F (36.6 °C)       Physical Exam   Constitutional: She is oriented to person, place, and time. She appears well-developed and well-nourished.   HENT:   Head: Normocephalic and atraumatic.   Eyes: Pupils are equal, round, and reactive to light. No scleral icterus.   Cardiovascular: Normal rate, regular rhythm and normal heart sounds.   Pulmonary/Chest: Effort normal and breath sounds normal. She has no wheezes. She has no rales.    Abdominal: Soft. Bowel sounds are normal. She exhibits no distension and no mass. There is no tenderness. No hernia.   Neurological: She is alert and oriented to person, place, and time. No cranial nerve deficit.   Skin: Skin is warm and dry. No rash noted.   Psychiatric: She has a normal mood and affect. Her behavior is normal.   Vitals reviewed.      Hospital Outpatient Visit on 05/23/2019   Component Date Value Ref Range Status   • Nuclear Prior Study 05/23/2019 2   Final   • BH CV STRESS PROTOCOL 1 05/23/2019 Mario   Final   • Stage 1 05/23/2019 1   Final   • HR Stage 1 05/23/2019 125   Final   • BP Stage 1 05/23/2019 140/80   Final   • Duration Min Stage 1 05/23/2019 3   Final   • Duration Sec Stage 1 05/23/2019 0   Final   • Grade Stage 1 05/23/2019 10   Final   • Speed Stage 1 05/23/2019 1.7   Final   • BH CV STRESS METS STAGE 1 05/23/2019 5   Final   • Stage 2 05/23/2019 2   Final   • HR Stage 2 05/23/2019 156   Final   • BP Stage 2 05/23/2019 160/78   Final   • Duration Min Stage 2 05/23/2019 2   Final   • Duration Sec Stage 2 05/23/2019 30   Final   • Grade Stage 2 05/23/2019 12   Final   • Speed Stage 2 05/23/2019 2.5   Final   • BH CV STRESS METS STAGE 2 05/23/2019 7.5   Final   • Baseline HR 05/23/2019 69  bpm Final   • Baseline BP 05/23/2019 130/72  mmHg Final   • Peak HR 05/23/2019 156  bpm Final   • Percent Max Pred HR 05/23/2019 106.85  % Final   • Percent Target HR 05/23/2019 126  % Final   • Peak BP 05/23/2019 160/78  mmHg Final   • Recovery HR 05/23/2019 82  bpm Final   • Recovery BP 05/23/2019 138/72  mmHg Final   • Target HR (85%) 05/23/2019 124  bpm Final   • Max. Pred. HR (100%) 05/23/2019 146  bpm Final   • Exercise duration (sec) 05/23/2019 30  sec Final   • Exercise duration (min) 05/23/2019 5  min Final   • Estimated workload 05/23/2019 7.0  METS Final   • Nuc Stress EF 05/23/2019 59  % Final       Elsie was seen today for follow-up, med refill and pancreatic cyst.    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      Patient 74-year-old female with history of cystic lesion in the pancreas clinically with pancreatic exocrine insufficiency here for follow-up.  Patient actually returned to [facility] where they said the cystic lesion had grown slightly but was no significant change.  Also suggested by surgery there that she may not need the Creon so patient attempted to come off and noted marked worsening of her symptoms.  Patient restarted the Creon and she is reports excellent improvement.  For now we will refill her Creon and follow-up clinically.  We will send CA-19-9 as tumor marker for this pancreatic mass and follow clinically.

## 2019-06-12 LAB — CANCER AG19-9 SERPL-ACNC: 9 U/ML (ref 0–35)

## 2019-06-25 ENCOUNTER — HOSPITAL ENCOUNTER (OUTPATIENT)
Dept: PAIN MEDICINE | Facility: HOSPITAL | Age: 74
Discharge: HOME | End: 2019-06-25
Admitting: ANESTHESIOLOGY

## 2019-06-25 ENCOUNTER — ANESTHESIA (OUTPATIENT)
Dept: PAIN MEDICINE | Facility: HOSPITAL | Age: 74
End: 2019-06-25

## 2019-06-25 ENCOUNTER — ANESTHESIA EVENT (OUTPATIENT)
Dept: PAIN MEDICINE | Facility: HOSPITAL | Age: 74
End: 2019-06-25

## 2019-06-25 ENCOUNTER — HOSPITAL ENCOUNTER (OUTPATIENT)
Dept: GENERAL RADIOLOGY | Facility: HOSPITAL | Age: 74
Discharge: HOME | End: 2019-06-25

## 2019-06-25 VITALS
BODY MASS INDEX: 24.25 KG/M2 | HEART RATE: 66 BPM | SYSTOLIC BLOOD PRESSURE: 110 MMHG | DIASTOLIC BLOOD PRESSURE: 58 MMHG | WEIGHT: 160 LBS | OXYGEN SATURATION: 97 % | RESPIRATION RATE: 16 BRPM | TEMPERATURE: 97.6 F | HEIGHT: 68 IN

## 2019-06-25 DIAGNOSIS — R52 PAIN: ICD-10-CM

## 2019-06-25 DIAGNOSIS — M51.36 DDD (DEGENERATIVE DISC DISEASE), LUMBAR: Primary | ICD-10-CM

## 2019-06-25 DIAGNOSIS — M54.16 LUMBAR RADICULITIS: ICD-10-CM

## 2019-06-25 PROBLEM — M51.369 DDD (DEGENERATIVE DISC DISEASE), LUMBAR: Status: ACTIVE | Noted: 2019-06-25

## 2019-06-25 PROCEDURE — C1755 CATHETER, INTRASPINAL: HCPCS

## 2019-06-25 PROCEDURE — 0 IOPAMIDOL 41 % SOLUTION: Performed by: ANESTHESIOLOGY

## 2019-06-25 PROCEDURE — 77003 FLUOROGUIDE FOR SPINE INJECT: CPT

## 2019-06-25 PROCEDURE — 25010000002 METHYLPREDNISOLONE PER 80 MG: Performed by: ANESTHESIOLOGY

## 2019-06-25 RX ORDER — MIDAZOLAM HYDROCHLORIDE 1 MG/ML
1 INJECTION INTRAMUSCULAR; INTRAVENOUS AS NEEDED
Status: DISCONTINUED | OUTPATIENT
Start: 2019-06-25 | End: 2019-06-26 | Stop reason: HOSPADM

## 2019-06-25 RX ORDER — BRIMONIDINE TARTRATE AND TIMOLOL MALEATE 2; 5 MG/ML; MG/ML
1 SOLUTION OPHTHALMIC 2 TIMES DAILY
COMMUNITY
Start: 2019-04-02

## 2019-06-25 RX ORDER — SODIUM CHLORIDE 0.9 % (FLUSH) 0.9 %
3 SYRINGE (ML) INJECTION EVERY 12 HOURS SCHEDULED
Status: DISCONTINUED | OUTPATIENT
Start: 2019-06-25 | End: 2019-06-26 | Stop reason: HOSPADM

## 2019-06-25 RX ORDER — GUAIFENESIN 600 MG/1
600 TABLET, EXTENDED RELEASE ORAL DAILY
COMMUNITY

## 2019-06-25 RX ORDER — FENTANYL CITRATE 50 UG/ML
50 INJECTION, SOLUTION INTRAMUSCULAR; INTRAVENOUS AS NEEDED
Status: DISCONTINUED | OUTPATIENT
Start: 2019-06-25 | End: 2019-06-26 | Stop reason: HOSPADM

## 2019-06-25 RX ORDER — METHYLPREDNISOLONE ACETATE 80 MG/ML
80 INJECTION, SUSPENSION INTRA-ARTICULAR; INTRALESIONAL; INTRAMUSCULAR; SOFT TISSUE ONCE
Status: COMPLETED | OUTPATIENT
Start: 2019-06-25 | End: 2019-06-25

## 2019-06-25 RX ORDER — LIDOCAINE HYDROCHLORIDE 10 MG/ML
1 INJECTION, SOLUTION INFILTRATION; PERINEURAL ONCE
Status: DISCONTINUED | OUTPATIENT
Start: 2019-06-25 | End: 2019-06-26 | Stop reason: HOSPADM

## 2019-06-25 RX ORDER — SODIUM CHLORIDE 0.9 % (FLUSH) 0.9 %
3-10 SYRINGE (ML) INJECTION AS NEEDED
Status: DISCONTINUED | OUTPATIENT
Start: 2019-06-25 | End: 2019-06-26 | Stop reason: HOSPADM

## 2019-06-25 RX ADMIN — IOPAMIDOL 1 ML: 408 INJECTION, SOLUTION INTRATHECAL at 13:49

## 2019-06-25 RX ADMIN — METHYLPREDNISOLONE ACETATE 80 MG: 80 INJECTION, SUSPENSION INTRA-ARTICULAR; INTRALESIONAL; INTRAMUSCULAR; SOFT TISSUE at 13:49

## 2019-06-25 NOTE — H&P
CHIEF COMPLAINT:  Low back pain        HISTORY OF PRESENT ILLNESS:  This patient is complaining of low back pain which radiates down the left leg in the lateral distribution to the ankle.  It ranges between a 2 and 8 out of 10 on the pain scale.  The pain is described as aching, intermittent, and sharp in nature. The pain is exacerbated by activity, lifting, standing, and walking, and relieved by massage, relaxation, lying down, TENS unit, rest, stretching, and prior epidurals greater than 2 years ago.  The patient has tried conservative therapy for greater than 6 weeks including: Ice, rest, heat, over-the-counter medications, physical therapy, and chiropractic therapy.  The pain is significantly decreasing the patient's Activities of Daily Living.  Diagnostic imaging (MRI): Degenerative grade 1 anterior listhesis of L4 on L5 with moderate left greater than right foraminal stenosis       This patient was referred to our clinic by Dr. Piedra for lumbar epidural steroid injections.    Prior injections afforded greater than 50 % relief of pain and significantly increased activities of daily living.  She last had epidurals greater than 2 years ago.     PAST MEDICAL HISTORY:  Current Outpatient Medications on File Prior to Encounter   Medication Sig Dispense Refill   • ALPRAZolam (XANAX) 0.5 MG tablet Take 0.5 mg by mouth 3 (Three) Times a Day As Needed.     • brimonidine-timolol (COMBIGAN) 0.2-0.5 % ophthalmic solution 1 drop 2 (Two) Times a Day.     • calcium polycarbophil (FIBERCON) 625 MG tablet Take 625 mg by mouth 4 (Four) Times a Day.     • Cetirizine HCl (ZYRTEC ALLERGY PO) Take 1 tablet by mouth Daily.     • Coenzyme Q10-Levocarnitine 100-20 MG capsule Take 1 tablet by mouth Daily. PT TO HOLD MED PRIOR TO OR     • EPINEPHRINE IJ Inject  as directed As Needed (pt has epi pen).     • esomeprazole (nexIUM) 20 MG capsule Take 20 mg by mouth Daily.     • Ezetimibe (ZETIA PO) Take 10 mg by mouth Daily.     •  Glucosamine-Chondroitin 250-200 MG capsule Take 1 capsule by mouth Daily.     • guaiFENesin (MUCINEX) 600 MG 12 hr tablet Take 400 mg by mouth 2 (Two) Times a Day.     • latanoprost (XALATAN) 0.005 % ophthalmic solution 1 drop.     • Magnesium Oxide 200 MG tablet Take 2 tablets by mouth Daily.     • montelukast (SINGULAIR) 10 MG tablet Take 10 mg by mouth Every Night.     • Nutritional Supplements (JUICE PLUS FIBRE PO) Take 2 tablets by mouth Daily.     • Nutritional Supplements (JUICE PLUS FIBRE PO) Take 1 can by mouth.     • Pancrelipase, Lip-Prot-Amyl, (CREON) 96067 units capsule delayed-release particles Take 1 capsule by mouth four times daily, before meals and with a snack. 360 capsule 3   • Propylene Glycol (SYSTANE BALANCE) 0.6 % solution 1 mL 6 (Six) Times a Day.     • Propylene Glycol 0.6 % solution 1 mL.     • rosuvastatin (CRESTOR) 5 MG tablet Take 5 mg by mouth. MONDAYS, WEDNESDAYS AND FRIDAYS     • UBIQUINOL PO Take 100 mg by mouth Daily.     • Unable to find Take 1 each by mouth Daily. RELIEF FACTOR- 4 CAPSULES DAILY  PT TO HOLD MED PRIOR TO OR     • [DISCONTINUED] aspirin 81 MG EC tablet Take 81 mg by mouth.     • [DISCONTINUED] fluticasone (FLONASE) 50 MCG/ACT nasal spray 1 spray into each nostril Every Evening.     • [DISCONTINUED] linaclotide (LINZESS) 290 MCG capsule capsule Linzess 290 mcg capsule   Take 1 capsule every day by oral route.     • [DISCONTINUED] LORazepam (ATIVAN) 0.5 MG tablet Take 0.5 mg by mouth Every 8 (Eight) Hours As Needed for Anxiety.     • [DISCONTINUED] Triamcinolone Acet & Lidocaine 20-20 MG/4ML suspension triamcinolone 0.1 % topical ointment and dimethicone 5 % topical cream   2 x daily around nails     • [DISCONTINUED] verapamil SR (CALAN-SR) 180 MG CR tablet Take 180 mg by mouth Every Night.       No current facility-administered medications on file prior to encounter.        Past Medical History:   Diagnosis Date   • Abdominal pain    • Anxiety    • Degenerative  "spondylolisthesis, L4-5    • GERD (gastroesophageal reflux disease)    • Glaucoma    • Greater trochanteric bursitis     left   • High cholesterol    • History of chicken pox     Childhood   • History of diverticulosis    • History of foreign travel 02/2017    Parveen    • History of infectious mononucleosis    • History of mitral valve prolapse    • Infectious viral hepatitis 1976    A   • Irregular heart beat     ON MED SEVERAL YEARS   • Low back pain     GETTING EPIDURALS, DR CHAPA   • Osteoarthritis     Spine w/radiculopathy, lumbar region   • Peptic ulceration    • PONV (postoperative nausea and vomiting)    • Restless leg    • S/P epidural steroid injection    • Sleep apnea with use of continuous positive airway pressure (CPAP)    • Umbilical hernia        SOCIAL HISTORY:  Counseled to avoid tobacco     REVIEW OF SYSTEMS:  No pertinent hematologic, infectious, or constitutional symptoms.  Other review of systems non-contributory     PHYSICAL EXAM:  /63 (BP Location: Left arm, Patient Position: Sitting)   Pulse 64   Temp 36.4 °C (97.6 °F) (Oral)   Resp 16   Ht 172.7 cm (68\")   Wt 72.6 kg (160 lb)   SpO2 98%   BMI 24.33 kg/m²   Constitutional: Well-developed well-nourished no acute distress  General: Alert and oriented  Ophtho: EOMI  Airway: Mallampati 2  Cardiac:  Regular rate  Lungs:  Clear to auscultation bilaterally   GI: soft, nontender  Cervical Spine: Noncontributory  Sacroiliac Joints: Noncontributory  Musc: Decreased range of motion and pain with forward flexion  Neuro: Straight leg raise test causes radicular pain going down her lateral left leg to the ankle     DIAGNOSIS:  Post-Op Diagnosis Codes:  Post-Op Diagnosis Codes:     * DDD (degenerative disc disease), lumbar [M51.36]     * Lumbar radiculitis [M54.16]     PLAN:  -  Lumbar epidural steroid injections at the planned level of L4-L5 spaced approximately 2-4 weeks apart.  If pain control is acceptable after 1 or 2 injections, it " was discussed with the patient that they may return for the subsequent injections if and when their pain returns.    - Risks were discussed with the patient, including but not limited to: failure of relief, worsening pain, tissue, nerve, blood vessel or spinal cord damage, headache, post-dural-puncture headache, bleeding, epidural hematoma, infection, and epidural abscess. Benefits and alternatives were also discussed. No promises were made. The patient voiced understanding.  -  Physical therapy exercises at home should be considered, as tolerated, as prescribed by physical therapy or from the pain clinic handout (given to the patient).  Continuation of these exercises every day, or multiple times per week, even when the patient has good pain relief, was stressed to the patient as a preventative measure to decrease the frequency and severity of future pain episodes.  -  It is recommended that the patient use the least amount of opioid medication possible.     -  Heat and ice to the affected area as tolerated for pain control.  It was discussed that heating pads can cause burns.  -  Daily low impact exercise such as walking or water exercise was recommended to maintain overall health and aid in weight control.   -  Patient was counseled to abstain from tobacco products.  -  Follow up as needed for subsequent injections.      Skye Dominguez M.D., [facility], [facility]  Board Certified Pain Medicine Specialist by the American Board of Anesthesiology  Board Certified Anesthesiologist by the American Board of Anesthesiology

## 2019-06-25 NOTE — ANESTHESIA PROCEDURE NOTES
PAIN Epidural block    Pre-sedation assessment completed: 6/25/2019 1:41 PM    Patient reassessed immediately prior to procedure    Patient location during procedure: pain clinic  Start Time: 6/25/2019 1:43 PM  Stop Time: 6/25/2019 1:50 PM  Indication:procedure for pain  Performed By  Anesthesiologist: Jethro Garrison MD  Preanesthetic Checklist  Completed: patient identified, site marked, surgical consent, pre-op evaluation, timeout performed, IV checked, risks and benefits discussed and monitors and equipment checked  Additional Notes  Post-Op Diagnosis Codes:     * DDD (degenerative disc disease), lumbar (M51.36)     * Lumbar radiculitis (M54.16)    The patient was observed in recovery with no evidence of neurological deficits or other problems. All questions were answered. The patient was discharged with appropriate instructions.  Prep:  Pt Position:prone  Sterile Tech:cap, gloves, mask and sterile barrier  Prep:chlorhexidine gluconate and isopropyl alcohol  Monitoring:blood pressure monitoring, continuous pulse oximetry and EKG  Procedure:Sedation: no     Approach:left paramedian  Guidance: fluoroscopy  Location:lumbar  Level:4-5  Needle Type:Tuohy  Needle Gauge:20 G  Aspiration:negative  Medications:  Depomedrol:80 mg  Preservative Free Saline:3mL  Isovue:1mL  Comments:Appropriate epidural spread of contrast.   Post Assessment:  Post-procedure: Dressing per nursing.  Pt Tolerance:patient tolerated the procedure well with no apparent complications  Complications:no

## 2019-06-27 ENCOUNTER — TELEPHONE (OUTPATIENT)
Dept: PAIN MEDICINE | Facility: HOSPITAL | Age: 74
End: 2019-06-27

## 2019-06-27 NOTE — TELEPHONE ENCOUNTER
Pt left message on our PM answering machine stating that she had a lesi on June 25 and had a bruise on her back and wanted us to call her back.  I reviewed he chart. Dr. Garrison did a lesi without any complications. I called pateint back. She explained that went to her [facility] where Dr. Porter looked at her bruise. She stated that he said it is just a bruise that was not cause d by the epidural. Pt denies any problems and stated she is ok.

## 2019-07-02 ENCOUNTER — TELEPHONE (OUTPATIENT)
Dept: GASTROENTEROLOGY | Facility: CLINIC | Age: 74
End: 2019-07-02

## 2019-07-02 NOTE — TELEPHONE ENCOUNTER
----- Message from Roby Orlando MD sent at 7/1/2019  4:04 PM EDT -----  Tumor markers all normal, follow-up 6 months

## 2019-07-23 ENCOUNTER — ANESTHESIA EVENT (OUTPATIENT)
Dept: PAIN MEDICINE | Facility: HOSPITAL | Age: 74
End: 2019-07-23

## 2019-07-23 ENCOUNTER — ANESTHESIA (OUTPATIENT)
Dept: PAIN MEDICINE | Facility: HOSPITAL | Age: 74
End: 2019-07-23

## 2019-07-23 ENCOUNTER — HOSPITAL ENCOUNTER (OUTPATIENT)
Dept: PAIN MEDICINE | Facility: HOSPITAL | Age: 74
Discharge: HOME | End: 2019-07-23
Admitting: ANESTHESIOLOGY

## 2019-07-23 ENCOUNTER — HOSPITAL ENCOUNTER (OUTPATIENT)
Dept: GENERAL RADIOLOGY | Facility: HOSPITAL | Age: 74
Discharge: HOME | End: 2019-07-23

## 2019-07-23 VITALS
OXYGEN SATURATION: 98 % | SYSTOLIC BLOOD PRESSURE: 113 MMHG | RESPIRATION RATE: 16 BRPM | TEMPERATURE: 97.8 F | DIASTOLIC BLOOD PRESSURE: 55 MMHG | HEART RATE: 69 BPM

## 2019-07-23 DIAGNOSIS — M51.36 DDD (DEGENERATIVE DISC DISEASE), LUMBAR: ICD-10-CM

## 2019-07-23 DIAGNOSIS — R52 PAIN: ICD-10-CM

## 2019-07-23 DIAGNOSIS — M54.16 LUMBAR RADICULITIS: Primary | ICD-10-CM

## 2019-07-23 PROCEDURE — 0 IOPAMIDOL 41 % SOLUTION: Performed by: ANESTHESIOLOGY

## 2019-07-23 PROCEDURE — 77003 FLUOROGUIDE FOR SPINE INJECT: CPT

## 2019-07-23 PROCEDURE — C1755 CATHETER, INTRASPINAL: HCPCS

## 2019-07-23 PROCEDURE — 25010000002 METHYLPREDNISOLONE PER 80 MG: Performed by: ANESTHESIOLOGY

## 2019-07-23 RX ORDER — FENTANYL CITRATE 50 UG/ML
50 INJECTION, SOLUTION INTRAMUSCULAR; INTRAVENOUS AS NEEDED
Status: DISCONTINUED | OUTPATIENT
Start: 2019-07-23 | End: 2019-07-24 | Stop reason: HOSPADM

## 2019-07-23 RX ORDER — MIDAZOLAM HYDROCHLORIDE 1 MG/ML
1 INJECTION INTRAMUSCULAR; INTRAVENOUS
Status: DISCONTINUED | OUTPATIENT
Start: 2019-07-23 | End: 2019-07-24 | Stop reason: HOSPADM

## 2019-07-23 RX ORDER — LIDOCAINE HYDROCHLORIDE 10 MG/ML
1 INJECTION, SOLUTION INFILTRATION; PERINEURAL ONCE
Status: DISCONTINUED | OUTPATIENT
Start: 2019-07-23 | End: 2019-07-24 | Stop reason: HOSPADM

## 2019-07-23 RX ORDER — METHYLPREDNISOLONE ACETATE 80 MG/ML
80 INJECTION, SUSPENSION INTRA-ARTICULAR; INTRALESIONAL; INTRAMUSCULAR; SOFT TISSUE ONCE
Status: COMPLETED | OUTPATIENT
Start: 2019-07-23 | End: 2019-07-23

## 2019-07-23 RX ADMIN — IOPAMIDOL 10 ML: 408 INJECTION, SOLUTION INTRATHECAL at 12:47

## 2019-07-23 RX ADMIN — METHYLPREDNISOLONE ACETATE 80 MG: 80 INJECTION, SUSPENSION INTRA-ARTICULAR; INTRALESIONAL; INTRAMUSCULAR; SOFT TISSUE at 12:47

## 2019-07-23 NOTE — INTERVAL H&P NOTE
[facility]  H&P reviewed. No changes since last visit.  Patient states   25-50% improvement since the last procedure/injection.    Diagnosis     * Lumbar degenerative disc disease [M51.36]     * Lumbar radiculopathy [M54.16]      Airway assessed since last visit. Airway class equals: 2.

## 2019-07-23 NOTE — ANESTHESIA PROCEDURE NOTES
PAIN Epidural block      Patient reassessed immediately prior to procedure    Patient location during procedure: pain clinic  Indication:procedure for pain  Performed By  Anesthesiologist: Newton Oneill MD  Preanesthetic Checklist  Completed: patient identified, site marked, surgical consent, pre-op evaluation, timeout performed, risks and benefits discussed and monitors and equipment checked  Additional Notes  Depomedrol - 80mg    Needle position confirmed by fluoroscopy and epidurogram using 2cc of [id number].    Diagnosis  Post-Op Diagnosis Codes:     * Lumbar degenerative disc disease (M51.36)     * Lumbar radiculopathy (M54.16)    Prep:  Pt Position:prone  Sterile Tech:cap, gloves, mask and sterile barrier  Prep:chlorhexidine gluconate and isopropyl alcohol  Monitoring:blood pressure monitoring, continuous pulse oximetry and EKG  Procedure:Sedation: no     Approach:left paramedian  Guidance: fluoroscopy  Location:lumbar  Level:4-5  Needle Type:Tuohy  Needle Gauge:20  Aspiration:negative  Medications:  Depomedrol:80 mg  Preservative Free Saline:2mL  Isovue:2mL    Post Assessment:  Post-procedure: bandaide.  Pt Tolerance:patient tolerated the procedure well with no apparent complications  Complications:no

## 2019-09-03 ENCOUNTER — APPOINTMENT (OUTPATIENT)
Dept: PAIN MEDICINE | Facility: HOSPITAL | Age: 74
End: 2019-09-03

## 2019-09-06 ENCOUNTER — ANESTHESIA (OUTPATIENT)
Dept: PAIN MEDICINE | Facility: HOSPITAL | Age: 74
End: 2019-09-06

## 2019-09-06 ENCOUNTER — ANESTHESIA EVENT (OUTPATIENT)
Dept: PAIN MEDICINE | Facility: HOSPITAL | Age: 74
End: 2019-09-06

## 2019-09-06 ENCOUNTER — HOSPITAL ENCOUNTER (OUTPATIENT)
Dept: GENERAL RADIOLOGY | Facility: HOSPITAL | Age: 74
Discharge: HOME | End: 2019-09-06

## 2019-09-06 ENCOUNTER — HOSPITAL ENCOUNTER (OUTPATIENT)
Dept: PAIN MEDICINE | Facility: HOSPITAL | Age: 74
Discharge: HOME | End: 2019-09-06
Admitting: ANESTHESIOLOGY

## 2019-09-06 VITALS
RESPIRATION RATE: 16 BRPM | OXYGEN SATURATION: 97 % | DIASTOLIC BLOOD PRESSURE: 67 MMHG | TEMPERATURE: 98.1 F | HEART RATE: 72 BPM | SYSTOLIC BLOOD PRESSURE: 110 MMHG

## 2019-09-06 DIAGNOSIS — M54.16 LUMBAR RADICULITIS: ICD-10-CM

## 2019-09-06 DIAGNOSIS — R52 PAIN: ICD-10-CM

## 2019-09-06 DIAGNOSIS — M51.36 DDD (DEGENERATIVE DISC DISEASE), LUMBAR: Primary | ICD-10-CM

## 2019-09-06 PROCEDURE — 25010000002 METHYLPREDNISOLONE PER 80 MG: Performed by: ANESTHESIOLOGY

## 2019-09-06 PROCEDURE — C1755 CATHETER, INTRASPINAL: HCPCS

## 2019-09-06 PROCEDURE — 77003 FLUOROGUIDE FOR SPINE INJECT: CPT

## 2019-09-06 PROCEDURE — 0 IOPAMIDOL 41 % SOLUTION: Performed by: ANESTHESIOLOGY

## 2019-09-06 RX ORDER — LIDOCAINE HYDROCHLORIDE 10 MG/ML
1 INJECTION, SOLUTION INFILTRATION; PERINEURAL ONCE AS NEEDED
Status: DISCONTINUED | OUTPATIENT
Start: 2019-09-06 | End: 2019-09-07 | Stop reason: HOSPADM

## 2019-09-06 RX ORDER — MIDAZOLAM HYDROCHLORIDE 1 MG/ML
1 INJECTION INTRAMUSCULAR; INTRAVENOUS AS NEEDED
Status: DISCONTINUED | OUTPATIENT
Start: 2019-09-06 | End: 2019-09-07 | Stop reason: HOSPADM

## 2019-09-06 RX ORDER — METHYLPREDNISOLONE ACETATE 80 MG/ML
80 INJECTION, SUSPENSION INTRA-ARTICULAR; INTRALESIONAL; INTRAMUSCULAR; SOFT TISSUE ONCE
Status: COMPLETED | OUTPATIENT
Start: 2019-09-06 | End: 2019-09-06

## 2019-09-06 RX ORDER — SODIUM CHLORIDE 0.9 % (FLUSH) 0.9 %
1-10 SYRINGE (ML) INJECTION AS NEEDED
Status: DISCONTINUED | OUTPATIENT
Start: 2019-09-06 | End: 2019-09-07 | Stop reason: HOSPADM

## 2019-09-06 RX ORDER — FENTANYL CITRATE 50 UG/ML
50 INJECTION, SOLUTION INTRAMUSCULAR; INTRAVENOUS AS NEEDED
Status: DISCONTINUED | OUTPATIENT
Start: 2019-09-06 | End: 2019-09-07 | Stop reason: HOSPADM

## 2019-09-06 RX ADMIN — METHYLPREDNISOLONE ACETATE 80 MG: 80 INJECTION, SUSPENSION INTRA-ARTICULAR; INTRALESIONAL; INTRAMUSCULAR; SOFT TISSUE at 13:10

## 2019-09-06 RX ADMIN — IOPAMIDOL 10 ML: 408 INJECTION, SOLUTION INTRATHECAL at 13:10

## 2019-09-06 NOTE — H&P
[facility]    History and Physical    Patient Name: Elsie Burleson  :  1945  MRN:  [id number]  Date of Admission: 2019    Subjective     Patient is a 74 y.o. female presents with chief complaint of chronic low back pain.  Onset of symptoms was gradual starting several years ago.  Symptoms are associated/aggravated by nothing in particular. Symptoms improve with injection    The following portions of the patients history were reviewed and updated as appropriate: current medications, allergies, past medical history, past surgical history, past family history, past social history and problem list     She is had a history of low back pain for several years.  She says at least 5 years ago she had epidural steroid injections which were quite helpful.  She then recently started having back pain again and has had 2 previous epidural steroid injections.  Up until recently she said she had at least 80% relief of her discomfort.  She is currently having low back pain that radiates primarily into her left hip and down the back of her left leg.  She is done physical therapy in the past which was moderately helpful.  She does not take anti-inflammatories.  She states she had imaging of her lumbar spine however I am unable to locate that.                Objective     Past Medical History:   Past Medical History:   Diagnosis Date   • Abdominal pain    • Anxiety    • DDD (degenerative disc disease), lumbar 2019   • Degenerative spondylolisthesis, L4-5    • GERD (gastroesophageal reflux disease)    • Glaucoma    • Greater trochanteric bursitis     left   • High cholesterol    • History of chicken pox     Childhood   • History of diverticulosis    • History of foreign travel 2017    Parveen    • History of infectious mononucleosis    • History of mitral valve prolapse    • Infectious viral hepatitis     A   • Irregular heart beat     ON MED SEVERAL YEARS   • Low back pain     GETTING EPIDURALS,   [facility]   • Lumbar radiculitis 2019   • Osteoarthritis     Spine w/radiculopathy, lumbar region   • Peptic ulceration    • PONV (postoperative nausea and vomiting)    • Restless leg    • S/P epidural steroid injection    • Sleep apnea with use of continuous positive airway pressure (CPAP)    • Umbilical hernia      Past Surgical History:   Past Surgical History:   Procedure Laterality Date   • APPENDECTOMY     • BLADDER SURGERY     • BUNIONECTOMY Bilateral    • CATARACT EXTRACTION Bilateral    • COLONOSCOPY     • ENDOSCOPY  2017    EUS-  Mass in pancreatic body   • EPIDURAL BLOCK     • GLAUCOMA SURGERY Bilateral    • HAMMER TOE REPAIR Right    • HYSTERECTOMY     • SEN'S NEUROMA EXCISION Left    • PELVIC FLOOR REPAIR     • ROTATOR CUFF REPAIR     • SHOULDER ARTHROSCOPY Right 2014    With rotator cuff repair 2 x 1.5 cm, subacromial decompression w/acromioplasty, debridement of labral tearing and biceps tenotomy and chondroplasty   • SKIN BIOPSY     • TONSILLECTOMY     • UMBILICAL HERNIA REPAIR N/A 2017    Procedure: UMBILICAL HERNIA REPAIR;  Surgeon: Haseeb Monterroso Jr., MD;  Location: [facility];  Service:      Family History:   Family History   Problem Relation Age of Onset   • Heart disease Mother    • Hyperlipidemia Mother    • Stroke Mother    • Hypertension Mother    • Heart disease Father    • Heart disease Brother    • Arthritis Brother    • Hyperlipidemia Brother    • Stroke Brother    • Hypertension Brother    • Malig Hyperthermia Neg Hx      Social History:   Social History     Tobacco Use   • Smoking status: Former Smoker     Packs/day: 0.50     Types: Cigarettes     Start date:      Last attempt to quit:      Years since quittin.6   • Smokeless tobacco: Never Used   • Tobacco comment: QUIT 22 YEARS AGO//25 yrs, 1 ppd   Substance Use Topics   • Alcohol use: Yes     Alcohol/week: 0.6 oz     Types: 1 Glasses of wine per week     Comment: per  day   • Drug use: No       Vital Signs Range for the last 24 hours  Temperature: Temp:  [36.7 °C (98.1 °F)] 36.7 °C (98.1 °F)   Temp Source: Temp src: Oral   BP: BP: (129)/(67) 129/67   Pulse: Heart Rate:  [72] 72   Respirations: Resp:  [16] 16   SPO2: SpO2:  [100 %] 100 %   O2 Amount (l/min):     O2 Devices Device (Oxygen Therapy): room air   Weight:           --------------------------------------------------------------------------------    Current Outpatient Medications   Medication Sig Dispense Refill   • ALPRAZolam (XANAX) 0.5 MG tablet Take 0.5 mg by mouth 3 (Three) Times a Day As Needed.     • brimonidine-timolol (COMBIGAN) 0.2-0.5 % ophthalmic solution 1 drop 2 (Two) Times a Day.     • calcium polycarbophil (FIBERCON) 625 MG tablet Take 625 mg by mouth 4 (Four) Times a Day.     • Cetirizine HCl (ZYRTEC ALLERGY PO) Take 1 tablet by mouth Daily.     • Coenzyme Q10-Levocarnitine 100-20 MG capsule Take 1 tablet by mouth Daily. PT TO HOLD MED PRIOR TO OR     • EPINEPHRINE IJ Inject  as directed As Needed (pt has epi pen).     • esomeprazole (nexIUM) 20 MG capsule Take 20 mg by mouth Daily.     • Ezetimibe (ZETIA PO) Take 10 mg by mouth Daily.     • Glucosamine-Chondroitin 250-200 MG capsule Take 1 capsule by mouth Daily.     • guaiFENesin (MUCINEX) 600 MG 12 hr tablet Take 400 mg by mouth 2 (Two) Times a Day.     • Magnesium Oxide 200 MG tablet Take 2 tablets by mouth Daily.     • montelukast (SINGULAIR) 10 MG tablet Take 10 mg by mouth Every Night.     • Nutritional Supplements (JUICE PLUS FIBRE PO) Take 2 tablets by mouth Daily.     • Nutritional Supplements (JUICE PLUS FIBRE PO) Take 1 can by mouth.     • Pancrelipase, Lip-Prot-Amyl, (CREON) 92852 units capsule delayed-release particles Take 1 capsule by mouth four times daily, before meals and with a snack. 360 capsule 3   • rosuvastatin (CRESTOR) 5 MG tablet Take 5 mg by mouth. MONDAYS, WEDNESDAYS AND FRIDAYS     • UBIQUINOL PO Take 100 mg by mouth Daily.      • Unable to find Take 1 each by mouth Daily. RELIEF FACTOR- 4 CAPSULES DAILY  PT TO HOLD MED PRIOR TO OR       No current facility-administered medications for this encounter.        --------------------------------------------------------------------------------  Assessment/Plan      Anesthesia Evaluation     history of anesthetic complications: PONV        Pain impairs ability to perform ADLs: Exercise/Activity  Modalities previously tried to control pain with limited effectiveness within the last 4-6 weeks: Prescription medications, Physical therapy and OTC medications     Airway   Mallampati: II  TM distance: >3 FB  Neck ROM: limited  No difficulty expected  Dental      Pulmonary - normal exam   (+) sleep apnea,   Cardiovascular - normal exam    Rhythm: regular    (+) hyperlipidemia,   (-) murmur      Neuro/Psych- neuro exam normal  (+) numbness, psychiatric history,     GI/Hepatic/Renal/Endo    (+)  GERD, PUD,  hepatitis, liver disease,     Musculoskeletal (-) normal exam    Abdominal  - normal exam   Substance History      OB/GYN          Other                   Diagnosis and Plan    Treatment Plan  ASA 2   Patient has had previous injection/procedure with % improvement.   Procedures: Lumbar Epidural Steroid Injection(LESI), With fluoroscopy,           She had previous epidural steroid injections at the L4-5 which were fairly helpful.  I will hope to go at that level as well.    Diagnosis     * Lumbar neuritis [M54.16]     * Lumbago [M54.5]     * Degeneration of lumbar intervertebral disc [M51.36]

## 2019-09-06 NOTE — ANESTHESIA PROCEDURE NOTES
PAIN Epidural block    Pre-sedation assessment completed: 9/6/2019 1:04 PM    Patient reassessed immediately prior to procedure    Patient location during procedure: pain clinic  Start Time: 9/6/2019 1:05 PM  Stop Time: 9/6/2019 1:12 PM  Indication:at surgeon's request and procedure for pain  Performed By  Anesthesiologist: Sancho Tuttle MD  Preanesthetic Checklist  Completed: patient identified, surgical consent, pre-op evaluation, timeout performed, risks and benefits discussed and monitors and equipment checked  Additional Notes  Post-Op Diagnosis Codes:     * Lumbar neuritis (M54.16)     * Lumbago (M54.5)     * Degeneration of lumbar intervertebral disc (M51.36)    Prep:  Pt Position:prone  Sterile Tech:sterile barrier, mask and gloves  Prep:chlorhexidine gluconate and isopropyl alcohol  Monitoring:blood pressure monitoring, continuous pulse oximetry and EKG  Procedure:Sedation: no     Approach:left paramedian  Guidance: fluoroscopy  Location:lumbar  Level:4-5  Needle Type:Tuohy  Needle Gauge:20 G  Aspiration:negative  Test Dose:negative  Medications:  Depomedrol:80 mg  Preservative Free Saline:2mL  Isovue:2mL  Comments:Lumbar epidural steroid injection was performed using a left paramedian approach at L4-5.  There was a good loss resistance.  No return of red blood cells or cerebral spinal fluid.  2 mL of Isovue were injected demonstrating cranial caudal spread primarily in the left.  No pain with injection.  80 mg of Depo-Medrol was then slowly injected and the needle was withdrawn.  Post Assessment:  Pt Tolerance:patient tolerated the procedure well with no apparent complications  Complications:no

## 2020-02-18 ENCOUNTER — TELEPHONE (OUTPATIENT)
Dept: GASTROENTEROLOGY | Facility: CLINIC | Age: 75
End: 2020-02-18

## 2020-02-18 NOTE — TELEPHONE ENCOUNTER
Patient called, she states her Creon is over $80/month. Advised patient to go on-line to [email or web address] and find out if she qualifies for financial assistance. She verb understanding.

## 2020-02-18 NOTE — TELEPHONE ENCOUNTER
Patient called back and states she does not qualify for financial assistance for Creon. E-scribed medication to [facility].

## 2020-02-18 NOTE — TELEPHONE ENCOUNTER
----- Message from Sarah Nowak sent at 2/18/2020 12:30 PM EST -----  Regarding: Pancrelipase, Lip-Prot-Amyl, (CREON) 65146   Contact: [phone]  Pt called in and said she is using a mail order for her prescription she did not know the name of the mail order. [phone] fax [phone] pt also wants to know if she can take 3 a day instead of 4.

## 2020-03-05 ENCOUNTER — OFFICE VISIT (OUTPATIENT)
Dept: GASTROENTEROLOGY | Facility: CLINIC | Age: 75
End: 2020-03-05

## 2020-03-05 VITALS
HEIGHT: 68 IN | TEMPERATURE: 97.8 F | BODY MASS INDEX: 23.49 KG/M2 | WEIGHT: 155 LBS | SYSTOLIC BLOOD PRESSURE: 118 MMHG | DIASTOLIC BLOOD PRESSURE: 68 MMHG

## 2020-03-05 DIAGNOSIS — Z09 ENCOUNTER FOR FOLLOW-UP EXAMINATION AFTER COMPLETED TREATMENT FOR CONDITIONS OTHER THAN MALIGNANT NEOPLASM: ICD-10-CM

## 2020-03-05 DIAGNOSIS — K86.89 PANCREATIC MASS: Primary | ICD-10-CM

## 2020-03-05 DIAGNOSIS — D37.9 NEOPLASM OF UNCERTAIN BEHAVIOR OF DIGESTIVE ORGAN, UNSPECIFIED: ICD-10-CM

## 2020-03-05 LAB — CEA SERPL-MCNC: 2.42 NG/ML

## 2020-03-05 PROCEDURE — 99212 OFFICE O/P EST SF 10 MIN: CPT | Performed by: INTERNAL MEDICINE

## 2020-03-05 NOTE — PROGRESS NOTES
Chief Complaint   Patient presents with   • Pancreatic Mass       Elsie Burleson is a  74 y.o. female here for a follow up visit for cystic mass in the pancreas.    HPI     Patient 74-year-old female with history of cystic mass in the tail of the pancreas here for follow-up.  Patient status post visit to [facility] with mild increase in size of the pancreatic cyst.  MRI showed no significant thickening of the pancreatic cyst wall or mass lesion within the cyst.  Patient was recommended for follow-up.  No tumor markers were performed at the time but patient does report continued asymptomatic as long as she takes her Creon regularly.    Past Medical History:   Diagnosis Date   • Abdominal pain    • Anxiety    • DDD (degenerative disc disease), lumbar 6/25/2019   • Degenerative spondylolisthesis, L4-5    • GERD (gastroesophageal reflux disease)    • Glaucoma    • Greater trochanteric bursitis     left   • High cholesterol    • History of chicken pox     Childhood   • History of diverticulosis    • History of foreign travel 02/2017    Parveen    • History of infectious mononucleosis    • History of mitral valve prolapse    • Infectious viral hepatitis 1976    A   • Irregular heart beat     ON MED SEVERAL YEARS   • Low back pain     GETTING EPIDURALS, DR CHAPA   • Lumbar radiculitis 6/25/2019   • Osteoarthritis     Spine w/radiculopathy, lumbar region   • Peptic ulceration    • PONV (postoperative nausea and vomiting)    • Restless leg    • S/P epidural steroid injection    • Sleep apnea with use of continuous positive airway pressure (CPAP)    • Umbilical hernia          Current Outpatient Medications:   •  ALPRAZolam (XANAX) 0.5 MG tablet, Take 0.5 mg by mouth 3 (Three) Times a Day As Needed., Disp: , Rfl:   •  brimonidine-timolol (COMBIGAN) 0.2-0.5 % ophthalmic solution, 1 drop 2 (Two) Times a Day., Disp: , Rfl:   •  calcium polycarbophil (FIBERCON) 625 MG tablet, Take 625 mg by mouth 4 (Four) Times a  Day., Disp: , Rfl:   •  Cetirizine HCl (ZYRTEC ALLERGY PO), Take 1 tablet by mouth Daily., Disp: , Rfl:   •  Coenzyme Q10-Levocarnitine 100-20 MG capsule, Take 1 tablet by mouth Daily. PT TO HOLD MED PRIOR TO OR, Disp: , Rfl:   •  EPINEPHRINE IJ, Inject  as directed As Needed (pt has epi pen)., Disp: , Rfl:   •  esomeprazole (nexIUM) 20 MG capsule, Take 20 mg by mouth Daily., Disp: , Rfl:   •  Ezetimibe (ZETIA PO), Take 10 mg by mouth Daily., Disp: , Rfl:   •  Glucosamine-Chondroitin 250-200 MG capsule, Take 1 capsule by mouth Daily., Disp: , Rfl:   •  guaiFENesin (MUCINEX) 600 MG 12 hr tablet, Take 400 mg by mouth 2 (Two) Times a Day., Disp: , Rfl:   •  Magnesium Oxide 200 MG tablet, Take 2 tablets by mouth Daily., Disp: , Rfl:   •  montelukast (SINGULAIR) 10 MG tablet, Take 10 mg by mouth Every Night., Disp: , Rfl:   •  Nutritional Supplements (JUICE PLUS FIBRE PO), Take 2 tablets by mouth Daily., Disp: , Rfl:   •  Nutritional Supplements (JUICE PLUS FIBRE PO), Take 1 can by mouth., Disp: , Rfl:   •  Pancrelipase, Lip-Prot-Amyl, (CREON) 84784 units capsule delayed-release particles, Take 1 capsule by mouth four times daily, before meals and with a snack., Disp: 360 capsule, Rfl: 3  •  rosuvastatin (CRESTOR) 5 MG tablet, Take 5 mg by mouth. MONDAYS, WEDNESDAYS AND FRIDAYS, Disp: , Rfl:   •  UBIQUINOL PO, Take 100 mg by mouth Daily., Disp: , Rfl:   •  Unable to find, Take 1 each by mouth Daily. RELIEF FACTOR- 4 CAPSULES DAILY PT TO HOLD MED PRIOR TO OR, Disp: , Rfl:     Allergies   Allergen Reactions   • Ceclor [Cefaclor] Anaphylaxis and Swelling     THROAT SWELLING   • Cherry Anaphylaxis     DIFFICULTY BREATHING   • Brimonidine Tartrate-Timolol Other (See Comments)     EYE REDNESS   • Bee Venom Hives       Social History     Socioeconomic History   • Marital status:      Spouse name: Ellis   • Number of children: 2   • Years of education: College   • Highest education level: Not on file   Occupational  History     Employer: RETIRED   Tobacco Use   • Smoking status: Former Smoker     Packs/day: 0.50     Types: Cigarettes     Start date:      Last attempt to quit:      Years since quittin.1   • Smokeless tobacco: Never Used   • Tobacco comment: QUIT 22 YEARS AGO//25 yrs, 1 ppd   Substance and Sexual Activity   • Alcohol use: Yes     Alcohol/week: 1.0 standard drinks     Types: 1 Glasses of wine per week     Comment: per day   • Drug use: No   • Sexual activity: Defer       Family History   Problem Relation Age of Onset   • Heart disease Mother    • Hyperlipidemia Mother    • Stroke Mother    • Hypertension Mother    • Heart disease Father    • Heart disease Brother    • Arthritis Brother    • Hyperlipidemia Brother    • Stroke Brother    • Hypertension Brother    • Malig Hyperthermia Neg Hx        Review of Systems   Constitutional: Negative.    Respiratory: Negative.    Cardiovascular: Negative.    Gastrointestinal: Negative.    Skin: Negative.    Hematological: Negative.        Vitals:    20 1044   BP: 118/68   Temp: 97.8 °F (36.6 °C)       Physical Exam   Constitutional: She is oriented to person, place, and time. She appears well-developed and well-nourished.   HENT:   Head: Normocephalic and atraumatic.   Eyes: Pupils are equal, round, and reactive to light. No scleral icterus.   Cardiovascular: Normal rate, regular rhythm and normal heart sounds.   Pulmonary/Chest: Effort normal.   Abdominal: Soft. Bowel sounds are normal. She exhibits no distension and no mass. There is no tenderness. No hernia.   Neurological: She is alert and oriented to person, place, and time.   Skin: Skin is warm and dry.   Psychiatric: She has a normal mood and affect. Her behavior is normal.   Vitals reviewed.      No visits with results within 2 Month(s) from this visit.   Latest known visit with results is:   Office Visit on 2019   Component Date Value Ref Range Status   • CA 19-9 2019 9  0 - 35 U/mL  Final       Brewster was seen today for pancreatic mass.    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  Patient noted MRI at [facility] with mild increase in size since last visit a year ago.  MRI noted with no significant wall thickening or mass lesion within the cyst but there was some small amount of size increase.  For now we will arrange tumor marker studies to confirm no significant abnormalities noted.  Plan on following up clinically and maintaining on Creon as symptomatically patient does better.  We will follow-up if negative in 1 year.

## 2020-03-06 LAB — CANCER AG19-9 SERPL-ACNC: 8 U/ML (ref 0–35)

## 2020-03-30 ENCOUNTER — TELEPHONE (OUTPATIENT)
Dept: GASTROENTEROLOGY | Facility: CLINIC | Age: 75
End: 2020-03-30

## 2020-03-30 NOTE — TELEPHONE ENCOUNTER
----- Message from Roby Orlando MD sent at 3/30/2020  2:27 PM EDT -----  Labs continue to be normal.  Follow-up 1 year as discussed.

## 2020-12-04 ENCOUNTER — TELEPHONE (OUTPATIENT)
Dept: GASTROENTEROLOGY | Facility: CLINIC | Age: 75
End: 2020-12-04

## 2020-12-04 RX ORDER — PANCRELIPASE 36000; 180000; 114000 [USP'U]/1; [USP'U]/1; [USP'U]/1
CAPSULE, DELAYED RELEASE PELLETS ORAL
Qty: 360 CAPSULE | Refills: 3 | Status: SHIPPED | OUTPATIENT
Start: 2020-12-04 | End: 2020-12-07

## 2020-12-04 NOTE — TELEPHONE ENCOUNTER
----- Message from Sarah Wolfe sent at 12/3/2020 10:37 AM EST -----  Regarding: Pancrelipase, Lip-Prot-Amyl, (CREON) 42247 units capsule delayed-release particles  Contact: [phone]  PT needs a refill on Pancrelipase, Lip-Prot-Amyl, (CREON) 57966 units capsule delayed-release particles and needs to be specific that she only wants 1 month supply at a time     Pharmacy:  [facility] - [place], [place] - [facility] - [phone]  - [phone]  Phone:  [phone]  Fax:  [phone]  Address:  [facility] [address]

## 2020-12-07 RX ORDER — PANCRELIPASE 36000; 180000; 114000 [USP'U]/1; [USP'U]/1; [USP'U]/1
36000 CAPSULE, DELAYED RELEASE PELLETS ORAL
Qty: 90 CAPSULE | Refills: 3 | Status: SHIPPED | OUTPATIENT
Start: 2020-12-07 | End: 2021-03-29

## 2021-03-09 DIAGNOSIS — Z23 IMMUNIZATION DUE: ICD-10-CM

## 2021-03-29 RX ORDER — PANCRELIPASE 36000; 180000; 114000 [USP'U]/1; [USP'U]/1; [USP'U]/1
CAPSULE, DELAYED RELEASE PELLETS ORAL
Qty: 90 CAPSULE | Refills: 3 | Status: SHIPPED | OUTPATIENT
Start: 2021-03-29 | End: 2021-04-06 | Stop reason: SDUPTHER

## 2021-04-06 ENCOUNTER — OFFICE VISIT (OUTPATIENT)
Dept: GASTROENTEROLOGY | Facility: CLINIC | Age: 76
End: 2021-04-06

## 2021-04-06 VITALS
TEMPERATURE: 97.4 F | OXYGEN SATURATION: 99 % | SYSTOLIC BLOOD PRESSURE: 112 MMHG | DIASTOLIC BLOOD PRESSURE: 70 MMHG | HEART RATE: 80 BPM | BODY MASS INDEX: 23.86 KG/M2 | WEIGHT: 157.4 LBS | RESPIRATION RATE: 16 BRPM | HEIGHT: 68 IN

## 2021-04-06 DIAGNOSIS — K86.89 PANCREATIC MASS: ICD-10-CM

## 2021-04-06 DIAGNOSIS — D37.9 NEOPLASM OF UNCERTAIN BEHAVIOR OF DIGESTIVE ORGAN, UNSPECIFIED: Primary | ICD-10-CM

## 2021-04-06 PROCEDURE — 99213 OFFICE O/P EST LOW 20 MIN: CPT | Performed by: INTERNAL MEDICINE

## 2021-04-06 RX ORDER — PANCRELIPASE 36000; 180000; 114000 [USP'U]/1; [USP'U]/1; [USP'U]/1
36000 CAPSULE, DELAYED RELEASE PELLETS ORAL
Qty: 270 CAPSULE | Refills: 3 | Status: SHIPPED | OUTPATIENT
Start: 2021-04-06 | End: 2021-07-29 | Stop reason: SDUPTHER

## 2021-04-06 RX ORDER — LEVOTHYROXINE SODIUM 0.05 MG/1
TABLET ORAL
COMMUNITY
Start: 2021-03-14

## 2021-04-06 NOTE — PROGRESS NOTES
Chief Complaint   Patient presents with   • Med Refill       Elsie Burleson is a  75 y.o. female here for a follow up visit for cystic mass in the pancreas.    HPI     Patient 75-year-old female with history of degenerative disc disease, GERD and a pancreatic cystic mass lesion in the tail here for follow-up.  Patient needs refills on her Nexium and Creon but also due for follow-up imaging of her pancreatic lesion.  Patient reports as long as she maintains on Creon and brand-name Nexium she does well though does seem to get symptomatic if she gets generics.  We will continue brand-name medication for refills.    Past Medical History:   Diagnosis Date   • Abdominal pain    • Anxiety    • DDD (degenerative disc disease), lumbar 6/25/2019   • Degenerative spondylolisthesis, L4-5    • GERD (gastroesophageal reflux disease)    • Glaucoma    • Greater trochanteric bursitis     left   • High cholesterol    • History of chicken pox     Childhood   • History of diverticulosis    • History of foreign travel 02/2017    Parveen    • History of infectious mononucleosis    • History of mitral valve prolapse    • Infectious viral hepatitis 1976    A   • Irregular heart beat     ON MED SEVERAL YEARS   • Low back pain     GETTING EPIDURALS, DR CHAPA   • Lumbar radiculitis 6/25/2019   • Osteoarthritis     Spine w/radiculopathy, lumbar region   • Peptic ulceration    • PONV (postoperative nausea and vomiting)    • Restless leg    • S/P epidural steroid injection    • Sleep apnea with use of continuous positive airway pressure (CPAP)    • Umbilical hernia          Current Outpatient Medications:   •  ALPRAZolam (XANAX) 0.5 MG tablet, Take 0.5 mg by mouth 3 (Three) Times a Day As Needed., Disp: , Rfl:   •  brimonidine-timolol (COMBIGAN) 0.2-0.5 % ophthalmic solution, 1 drop 2 (Two) Times a Day., Disp: , Rfl:   •  calcium polycarbophil (FIBERCON) 625 MG tablet, Take 625 mg by mouth 4 (Four) Times a Day., Disp: , Rfl:   •   Cetirizine HCl (ZYRTEC ALLERGY PO), Take 1 tablet by mouth Daily., Disp: , Rfl:   •  Coenzyme Q10-Levocarnitine 100-20 MG capsule, Take 1 tablet by mouth Daily. PT TO HOLD MED PRIOR TO OR, Disp: , Rfl:   •  EPINEPHRINE IJ, Inject  as directed As Needed (pt has epi pen)., Disp: , Rfl:   •  esomeprazole (nexIUM) 20 MG capsule, Take 1 capsule by mouth Daily., Disp: 90 capsule, Rfl: 3  •  Ezetimibe (ZETIA PO), Take 10 mg by mouth Daily., Disp: , Rfl:   •  Glucosamine-Chondroitin 250-200 MG capsule, Take 1 capsule by mouth Daily., Disp: , Rfl:   •  guaiFENesin (MUCINEX) 600 MG 12 hr tablet, Take 400 mg by mouth 2 (Two) Times a Day., Disp: , Rfl:   •  levothyroxine (SYNTHROID, LEVOTHROID) 50 MCG tablet, , Disp: , Rfl:   •  Magnesium Oxide 200 MG tablet, Take 2 tablets by mouth Daily., Disp: , Rfl:   •  Nutritional Supplements (JUICE PLUS FIBRE PO), Take 2 tablets by mouth Daily., Disp: , Rfl:   •  Nutritional Supplements (JUICE PLUS FIBRE PO), Take 1 can by mouth., Disp: , Rfl:   •  Pancrelipase, Lip-Prot-Amyl, (Creon) 86259 units capsule delayed-release particles capsule, Take 1 capsule by mouth 3 (Three) Times a Day With Meals., Disp: 270 capsule, Rfl: 3  •  rosuvastatin (CRESTOR) 5 MG tablet, Take 5 mg by mouth. MONDAYS, WEDNESDAYS AND FRIDAYS, Disp: , Rfl:   •  UBIQUINOL PO, Take 100 mg by mouth Daily., Disp: , Rfl:   •  Unable to find, Take 1 each by mouth Daily. RELIEF FACTOR- 4 CAPSULES DAILY PT TO HOLD MED PRIOR TO OR, Disp: , Rfl:   •  montelukast (SINGULAIR) 10 MG tablet, Take 10 mg by mouth Every Night., Disp: , Rfl:     Allergies   Allergen Reactions   • Ceclor [Cefaclor] Anaphylaxis and Swelling     THROAT SWELLING   • Cherry Anaphylaxis     DIFFICULTY BREATHING   • Brimonidine Tartrate-Timolol Other (See Comments)     EYE REDNESS   • Bee Venom Hives       Social History     Socioeconomic History   • Marital status:      Spouse name: Ellis   • Number of children: 2   • Years of education: College   •  Highest education level: Not on file   Tobacco Use   • Smoking status: Former Smoker     Packs/day: 0.50     Types: Cigarettes     Start date:      Quit date:      Years since quittin.2   • Smokeless tobacco: Never Used   • Tobacco comment: QUIT 22 YEARS AGO//25 yrs, 1 ppd   Substance and Sexual Activity   • Alcohol use: Yes     Alcohol/week: 1.0 standard drinks     Types: 1 Glasses of wine per week     Comment: per day   • Drug use: No   • Sexual activity: Defer       Family History   Problem Relation Age of Onset   • Heart disease Mother    • Hyperlipidemia Mother    • Stroke Mother    • Hypertension Mother    • Heart disease Father    • Heart disease Brother    • Arthritis Brother    • Hyperlipidemia Brother    • Stroke Brother    • Hypertension Brother    • Malig Hyperthermia Neg Hx    • Colon cancer Neg Hx    • Colon polyps Neg Hx        Review of Systems   Constitutional: Negative.    Respiratory: Negative.    Cardiovascular: Negative.    Gastrointestinal: Negative.    Musculoskeletal: Positive for gait problem.   Skin: Negative.    Hematological: Negative.        Vitals:    21 1122   BP: 112/70   Pulse: 80   Resp: 16   Temp: 97.4 °F (36.3 °C)   SpO2: 99%       Physical Exam  Vitals reviewed.   Constitutional:       Appearance: Normal appearance. She is well-developed and normal weight.   HENT:      Head: Normocephalic and atraumatic.   Eyes:      General: No scleral icterus.     Pupils: Pupils are equal, round, and reactive to light.   Cardiovascular:      Rate and Rhythm: Normal rate and regular rhythm.      Heart sounds: No murmur heard.   No friction rub. No gallop.    Pulmonary:      Effort: Pulmonary effort is normal.      Breath sounds: Normal breath sounds. No wheezing or rales.   Abdominal:      General: Bowel sounds are normal. There is no distension.      Palpations: Abdomen is soft. There is no mass.      Tenderness: There is no abdominal tenderness.      Hernia: No hernia is  present.   Skin:     General: Skin is warm and dry.   Neurological:      General: No focal deficit present.      Mental Status: She is alert and oriented to person, place, and time.   Psychiatric:         Behavior: Behavior normal.         Thought Content: Thought content normal.         No visits with results within 2 Month(s) from this visit.   Latest known visit with results is:   Office Visit on 03/05/2020   Component Date Value Ref Range Status   • CA 19-9 03/05/2020 8  0 - 35 U/mL Final   • CEA 03/05/2020 2.42  ng/mL Final       Diagnoses and all orders for this visit:    1. 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 Pancreatic mass  -     MRI Abdomen With & Without Contrast; Future    Other orders  -     Pancrelipase, Lip-Prot-Amyl, (Creon) 62685 units capsule delayed-release particles capsule; Take 1 capsule by mouth 3 (Three) Times a Day With Meals.  Dispense: 270 capsule; Refill: 3  -     esomeprazole (nexIUM) 20 MG capsule; Take 1 capsule by mouth Daily.  Dispense: 90 capsule; Refill: 3      Patient 75-year-old female with history of pancreatic cyst here for follow-up.  Patient denies any abdominal pain no nausea vomiting fever chills.  Patient has been having a limp related to back surgery.  Patient reports tolerating diet well as long she stays on a brand name Nexium and Creon.  Bowels are normal and no weight loss noted.  We will refill her meds and arrange follow-up MRI.

## 2021-05-03 ENCOUNTER — HOSPITAL ENCOUNTER (OUTPATIENT)
Dept: MRI IMAGING | Facility: HOSPITAL | Age: 76
Discharge: HOME | End: 2021-05-03
Admitting: INTERNAL MEDICINE

## 2021-05-03 DIAGNOSIS — K86.89 PANCREATIC MASS: ICD-10-CM

## 2021-05-03 DIAGNOSIS — D37.9 NEOPLASM OF UNCERTAIN BEHAVIOR OF DIGESTIVE ORGAN, UNSPECIFIED: ICD-10-CM

## 2021-05-03 LAB — CREAT BLDA-MCNC: 0.7 MG/DL (ref 0.6–1.3)

## 2021-05-03 PROCEDURE — A9577 INJ MULTIHANCE: HCPCS | Performed by: INTERNAL MEDICINE

## 2021-05-03 PROCEDURE — 0 GADOBENATE DIMEGLUMINE 529 MG/ML SOLUTION: Performed by: INTERNAL MEDICINE

## 2021-05-03 PROCEDURE — 74183 MRI ABD W/O CNTR FLWD CNTR: CPT

## 2021-05-03 PROCEDURE — 82565 ASSAY OF CREATININE: CPT

## 2021-05-03 RX ADMIN — GADOBENATE DIMEGLUMINE 15 ML: 529 INJECTION, SOLUTION INTRAVENOUS at 11:22

## 2021-05-07 ENCOUNTER — TELEPHONE (OUTPATIENT)
Dept: GASTROENTEROLOGY | Facility: CLINIC | Age: 76
End: 2021-05-07

## 2021-05-07 NOTE — TELEPHONE ENCOUNTER
----- Message from Sarah Wolfe sent at 5/7/2021 12:10 PM EDT -----  Regarding: Results  Contact: [phone]  PT is calling for results

## 2021-05-17 ENCOUNTER — TELEPHONE (OUTPATIENT)
Dept: GASTROENTEROLOGY | Facility: CLINIC | Age: 76
End: 2021-05-17

## 2021-05-17 NOTE — TELEPHONE ENCOUNTER
----- Message from Sarah Nowak sent at 5/17/2021 12:10 PM EDT -----  Regarding: mri results  Contact: [phone]  Pt is calling wanting her mri results.

## 2021-05-20 ENCOUNTER — TELEPHONE (OUTPATIENT)
Dept: GASTROENTEROLOGY | Facility: CLINIC | Age: 76
End: 2021-05-20

## 2021-05-20 NOTE — TELEPHONE ENCOUNTER
----- Message from Roby Orlando MD sent at 5/20/2021 12:18 PM EDT -----  No significant change seen from March that there has been some change over the years no surgery is recommended for these benign lesions unless symptoms worsen.  They do not become malignant only impede other organs with a get too big and if she gets symptomatic we can refer her.  For now would recommend continuing the Creon and Nexium and follow-up repeat imaging in 1 year.

## 2021-05-20 NOTE — TELEPHONE ENCOUNTER
Skye Thorne [facility] [address]  Phone Number: [phone]  Patient had a MRI of the pancreas 3 weeks ago and has called at least 5 times for results.  Please call patient with results.

## 2021-07-08 NOTE — TELEPHONE ENCOUNTER
Received phone call from [employer] requesting insurance information and paperwork. Faxed insurance information.  Once we receive the paperwork which requires a date will fax the information back.    Yes

## 2021-07-29 ENCOUNTER — TELEPHONE (OUTPATIENT)
Dept: GASTROENTEROLOGY | Facility: CLINIC | Age: 76
End: 2021-07-29

## 2021-07-29 RX ORDER — PANCRELIPASE 36000; 180000; 114000 [USP'U]/1; [USP'U]/1; [USP'U]/1
36000 CAPSULE, DELAYED RELEASE PELLETS ORAL
Qty: 90 CAPSULE | Refills: 11 | Status: SHIPPED | OUTPATIENT
Start: 2021-07-29 | End: 2022-07-28 | Stop reason: SDUPTHER

## 2021-07-30 ENCOUNTER — TELEPHONE (OUTPATIENT)
Dept: GASTROENTEROLOGY | Facility: CLINIC | Age: 76
End: 2021-07-30

## 2021-11-22 ENCOUNTER — PRE-PROCEDURE SCREENING (OUTPATIENT)
Dept: GASTROENTEROLOGY | Facility: CLINIC | Age: 76
End: 2021-11-22

## 2021-11-23 ENCOUNTER — PRE-PROCEDURE SCREENING (OUTPATIENT)
Dept: GASTROENTEROLOGY | Facility: CLINIC | Age: 76
End: 2021-11-23

## 2021-12-27 ENCOUNTER — TELEPHONE (OUTPATIENT)
Dept: GASTROENTEROLOGY | Facility: CLINIC | Age: 76
End: 2021-12-27

## 2022-01-03 ENCOUNTER — PREP FOR SURGERY (OUTPATIENT)
Dept: OTHER | Facility: HOSPITAL | Age: 77
End: 2022-01-03

## 2022-01-03 DIAGNOSIS — Z12.11 SCREEN FOR COLON CANCER: Primary | ICD-10-CM

## 2022-01-05 ENCOUNTER — TELEPHONE (OUTPATIENT)
Dept: GASTROENTEROLOGY | Facility: CLINIC | Age: 77
End: 2022-01-05

## 2022-01-05 PROBLEM — Z12.11 SCREEN FOR COLON CANCER: Status: ACTIVE | Noted: 2022-01-05

## 2022-01-05 NOTE — TELEPHONE ENCOUNTER
donta Greer for 03/02 arrive at 945/cs- mailing out prep inst on 01/06, advised pt time is subject to change [facility] will call.

## 2022-02-28 ENCOUNTER — TELEPHONE (OUTPATIENT)
Dept: GASTROENTEROLOGY | Facility: CLINIC | Age: 77
End: 2022-02-28

## 2022-04-11 ENCOUNTER — ANESTHESIA EVENT (OUTPATIENT)
Dept: GASTROENTEROLOGY | Facility: HOSPITAL | Age: 77
End: 2022-04-11

## 2022-04-11 ENCOUNTER — ANESTHESIA (OUTPATIENT)
Dept: GASTROENTEROLOGY | Facility: HOSPITAL | Age: 77
End: 2022-04-11

## 2022-04-11 ENCOUNTER — HOSPITAL ENCOUNTER (OUTPATIENT)
Facility: HOSPITAL | Age: 77
Setting detail: HOSPITAL OUTPATIENT SURGERY
Discharge: HOME | End: 2022-04-11
Attending: INTERNAL MEDICINE | Admitting: INTERNAL MEDICINE

## 2022-04-11 VITALS
BODY MASS INDEX: 24.6 KG/M2 | HEIGHT: 68 IN | SYSTOLIC BLOOD PRESSURE: 117 MMHG | HEART RATE: 59 BPM | RESPIRATION RATE: 16 BRPM | DIASTOLIC BLOOD PRESSURE: 68 MMHG | OXYGEN SATURATION: 96 % | TEMPERATURE: 97.9 F | WEIGHT: 162.3 LBS

## 2022-04-11 PROCEDURE — 25010000002 PROPOFOL 10 MG/ML EMULSION: Performed by: ANESTHESIOLOGY

## 2022-04-11 PROCEDURE — 45378 DIAGNOSTIC COLONOSCOPY: CPT | Performed by: INTERNAL MEDICINE

## 2022-04-11 PROCEDURE — S0260 H&P FOR SURGERY: HCPCS | Performed by: INTERNAL MEDICINE

## 2022-04-11 RX ORDER — PROPOFOL 10 MG/ML
VIAL (ML) INTRAVENOUS CONTINUOUS PRN
Status: DISCONTINUED | OUTPATIENT
Start: 2022-04-11 | End: 2022-04-11 | Stop reason: SURG

## 2022-04-11 RX ORDER — LIDOCAINE HYDROCHLORIDE 20 MG/ML
INJECTION, SOLUTION INFILTRATION; PERINEURAL AS NEEDED
Status: DISCONTINUED | OUTPATIENT
Start: 2022-04-11 | End: 2022-04-11 | Stop reason: SURG

## 2022-04-11 RX ORDER — SODIUM CHLORIDE, SODIUM LACTATE, POTASSIUM CHLORIDE, CALCIUM CHLORIDE 600; 310; 30; 20 MG/100ML; MG/100ML; MG/100ML; MG/100ML
30 INJECTION, SOLUTION INTRAVENOUS CONTINUOUS PRN
Status: DISCONTINUED | OUTPATIENT
Start: 2022-04-11 | End: 2022-04-11 | Stop reason: HOSPADM

## 2022-04-11 RX ADMIN — SODIUM CHLORIDE, POTASSIUM CHLORIDE, SODIUM LACTATE AND CALCIUM CHLORIDE 30 ML/HR: 600; 310; 30; 20 INJECTION, SOLUTION INTRAVENOUS at 08:46

## 2022-04-11 RX ADMIN — LIDOCAINE HYDROCHLORIDE 60 MG: 20 INJECTION, SOLUTION INFILTRATION; PERINEURAL at 09:12

## 2022-04-11 RX ADMIN — PROPOFOL 200 MCG/KG/MIN: 10 INJECTION, EMULSION INTRAVENOUS at 09:11

## 2022-04-11 NOTE — ANESTHESIA PREPROCEDURE EVALUATION
Anesthesia Evaluation     Patient summary reviewed and Nursing notes reviewed   history of anesthetic complications:               Airway   Mallampati: I  TM distance: >3 FB  Neck ROM: full  No difficulty expected  Dental - normal exam     Pulmonary - negative pulmonary ROS and normal exam   Cardiovascular - normal exam    (+) hyperlipidemia,       Neuro/Psych  (+) numbness, psychiatric history,    GI/Hepatic/Renal/Endo    (+)  GERD, PUD,  hepatitis, liver disease,     Musculoskeletal     Abdominal  - normal exam    Bowel sounds: normal.   Substance History - negative use     OB/GYN negative ob/gyn ROS         Other   arthritis,    history of cancer                    Anesthesia Plan    ASA 3     MAC       Anesthetic plan, all risks, benefits, and alternatives have been provided, discussed and informed consent has been obtained with: patient.        CODE STATUS:

## 2022-04-11 NOTE — H&P
[facility]  Pre Procedure History & Physical    Chief Complaint:   Colonoscopy screening    Subjective     HPI:   Patient 76-year-old female with history of hyperlipidemia, GERD here for colonoscopy screening.  Last colonoscopy 10 years ago.    Past Medical History:   Past Medical History:   Diagnosis Date   • Abdominal pain    • Anxiety    • DDD (degenerative disc disease), lumbar 6/25/2019   • Degenerative spondylolisthesis, L4-5    • GERD (gastroesophageal reflux disease)    • Glaucoma    • Greater trochanteric bursitis     left   • High cholesterol    • History of chicken pox     Childhood   • History of diverticulosis    • History of foreign travel 02/2017    Parveen    • History of infectious mononucleosis    • History of mitral valve prolapse    • Infectious viral hepatitis 1976    A   • Irregular heart beat     ON MED SEVERAL YEARS   • Low back pain     GETTING EPIDURALS, DR CHAPA   • Lumbar radiculitis 6/25/2019   • Osteoarthritis     Spine w/radiculopathy, lumbar region   • Peptic ulceration    • PONV (postoperative nausea and vomiting)    • Restless leg    • S/P epidural steroid injection    • Sleep apnea with use of continuous positive airway pressure (CPAP)    • Umbilical hernia        Past Surgical History:  Past Surgical History:   Procedure Laterality Date   • APPENDECTOMY  1964   • BLADDER SURGERY  2012   • BUNIONECTOMY Bilateral    • CATARACT EXTRACTION Bilateral    • COLONOSCOPY     • ENDOSCOPY  11/16/2017    EUS-  Mass in pancreatic body   • EPIDURAL BLOCK     • GLAUCOMA SURGERY Bilateral    • HAMMER TOE REPAIR Right 2000   • HYSTERECTOMY  1992   • SEN'S NEUROMA EXCISION Left 2012   • PELVIC FLOOR REPAIR     • ROTATOR CUFF REPAIR     • SHOULDER ARTHROSCOPY Right 09/25/2014    With rotator cuff repair 2 x 1.5 cm, subacromial decompression w/acromioplasty, debridement of labral tearing and biceps tenotomy and chondroplasty   • SKIN BIOPSY     • TONSILLECTOMY  1963    • UMBILICAL HERNIA REPAIR N/A 7/17/2017    Procedure: UMBILICAL HERNIA REPAIR;  Surgeon: Haseeb Monterroso Jr., MD;  Location: [facility] OR;  Service:        Family History:  Family History   Problem Relation Age of Onset   • Heart disease Mother    • Hyperlipidemia Mother    • Stroke Mother    • Hypertension Mother    • Heart disease Father    • Heart disease Brother    • Arthritis Brother    • Hyperlipidemia Brother    • Stroke Brother    • Hypertension Brother    • Malig Hyperthermia Neg Hx    • Colon cancer Neg Hx    • Colon polyps Neg Hx        Social History:   reports that she quit smoking about 27 years ago. Her smoking use included cigarettes. She started smoking about 59 years ago. She smoked 0.50 packs per day. She has never used smokeless tobacco. She reports current alcohol use of about 1.0 standard drink of alcohol per week. She reports that she does not use drugs.    Medications:   Medications Prior to Admission   Medication Sig Dispense Refill Last Dose   • brimonidine-timolol (COMBIGAN) 0.2-0.5 % ophthalmic solution 1 drop 2 (Two) Times a Day.   Past Week at Unknown time   • Cetirizine HCl (ZYRTEC ALLERGY PO) Take 1 tablet by mouth Daily.   Past Week at Unknown time   • Coenzyme Q10-Levocarnitine 100-20 MG capsule Take 1 tablet by mouth Daily. PT TO HOLD MED PRIOR TO OR   4/9/2022 at Unknown time   • esomeprazole (nexIUM) 20 MG capsule Take 1 capsule by mouth Daily. 90 capsule 3 Past Week at Unknown time   • Ezetimibe (ZETIA PO) Take 10 mg by mouth Daily.   Past Week at Unknown time   • guaiFENesin (MUCINEX) 600 MG 12 hr tablet Take 400 mg by mouth 2 (Two) Times a Day.   Past Week at Unknown time   • levothyroxine (SYNTHROID, LEVOTHROID) 50 MCG tablet    Past Week at Unknown time   • Magnesium Oxide 200 MG tablet Take 2 tablets by mouth Daily.   Past Week at Unknown time   • Nutritional Supplements (JUICE PLUS FIBRE PO) Take 2 tablets by mouth Daily.   Past Week at Unknown time   • Nutritional  "Supplements (JUICE PLUS FIBRE PO) Take 1 can by mouth.   Past Week at Unknown time   • Pancrelipase, Lip-Prot-Amyl, (Creon) 03606-736488 units capsule delayed-release particles capsule Take 1 capsule by mouth 3 (Three) Times a Day With Meals. 90 capsule 11 Past Week at Unknown time   • polycarbophil (calcium polycarbophil) 625 MG tablet tablet Take 625 mg by mouth 4 (Four) Times a Day.   Past Week at Unknown time   • rosuvastatin (CRESTOR) 5 MG tablet Take 5 mg by mouth. MONDAYS, WEDNESDAYS AND FRIDAYS   Past Week at Unknown time   • ALPRAZolam (XANAX) 0.5 MG tablet Take 0.5 mg by mouth 3 (Three) Times a Day As Needed.   4/9/2022   • EPINEPHRINE IJ Inject  as directed As Needed (pt has epi pen).      • Glucosamine-Chondroitin 250-200 MG capsule Take 1 capsule by mouth Daily.   4/9/2022   • montelukast (SINGULAIR) 10 MG tablet Take 10 mg by mouth Every Night.   More than a month at Unknown time   • UBIQUINOL PO Take 100 mg by mouth Daily.   4/9/2022   • Unable to find Take 1 each by mouth Daily. RELIEF FACTOR- 4 CAPSULES DAILY  PT TO HOLD MED PRIOR TO OR          Allergies:  Ceclor [cefaclor], Banda, Brimonidine tartrate-timolol, and Bee venom    ROS:    Pertinent items are noted in HPI     Objective     Blood pressure 121/59, pulse 62, resp. rate 14, height 172.7 cm (68\"), weight 73.6 kg (162 lb 4.8 oz), SpO2 96 %, not currently breastfeeding.    Physical Exam   Constitutional: Pt is oriented to person, place, and time and well-developed, well-nourished, and in no distress.   Mouth/Throat: Oropharynx is clear and moist.   Neck: Normal range of motion.   Cardiovascular: Normal rate, regular rhythm and normal heart sounds.    Pulmonary/Chest: Effort normal and breath sounds normal.   Abdominal: Soft. Nontender  Skin: Skin is warm and dry.   Psychiatric: Mood, memory, affect and judgment normal.     Assessment/Plan     Diagnosis:  Colonoscopy screening    Anticipated Surgical Procedure:  Colonoscopy    The risks, " benefits, and alternatives of this procedure have been discussed with the patient or the responsible party- the patient understands and agrees to proceed.

## 2022-04-11 NOTE — ANESTHESIA POSTPROCEDURE EVALUATION
"Patient: Elsie Burleson    Procedure Summary     Date: 04/11/22 Room / Location:  [facility]    Anesthesia Start: 0909 Anesthesia Stop: 0933    Procedure: COLONOSCOPY TO CECUM (N/A ) Diagnosis:       Screen for colon cancer      Diverticulosis      Internal hemorrhoids      (Screen for colon cancer [Z12.11])    Surgeons: Roby Orlando MD Provider: Randal Bernal MD    Anesthesia Type: MAC ASA Status: 3          Anesthesia Type: MAC    Vitals  Vitals Value Taken Time   /68 04/11/22 0958   Temp 36.6 °C (97.9 °F) 04/11/22 0948   Pulse 59 04/11/22 0958   Resp 16 04/11/22 0958   SpO2 96 % 04/11/22 0958           Post Anesthesia Care and Evaluation    Patient location during evaluation: PACU  Patient participation: complete - patient participated  Level of consciousness: awake  Pain score: 0  Pain management: adequate  Airway patency: patent  Anesthetic complications: No anesthetic complications  PONV Status: none  Cardiovascular status: acceptable  Respiratory status: acceptable  Hydration status: acceptable    Comments: /68 (BP Location: Left arm, Patient Position: Lying)   Pulse 59   Temp 36.6 °C (97.9 °F) (Oral)   Resp 16   Ht 172.7 cm (68\")   Wt 73.6 kg (162 lb 4.8 oz)   SpO2 96%   BMI 24.68 kg/m²       "

## 2022-04-11 NOTE — DISCHARGE INSTR - APPOINTMENTS
For the next 24 hours patient needs to be with a responsible adult.    For 24 hours DO NOT drive, operate machinery, appliances, drink alcohol, make important decisions or sign legal documents.    Start with a light or bland diet if you are feeling sick to your stomach otherwise advance to regular diet as tolerated.    Follow recommendations on procedure report if provided by your doctor.    Call Dr Orlando for problems .  Problems may include but not limited to: large amounts of bleeding, trouble breathing, repeated vomiting, severe unrelieved pain, fever or chills.

## 2022-04-11 NOTE — BRIEF OP NOTE
COLONOSCOPY  Progress Note    lEsie Burleson  4/11/2022    Pre-op Diagnosis:   Screen for colon cancer [Z12.11]       Post-Op Diagnosis Codes:     * Screen for colon cancer [Z12.11]     * Diverticulosis [K57.90]     * Internal hemorrhoids [K64.8]    Procedure/CPT® Codes:        Procedure(s):  COLONOSCOPY TO CECUM    Surgeon(s):  Roby Orlando MD    Anesthesia: Monitored Anesthesia Care    Staff:   Endo Technician: Michelle Avery RN  Endo Nurse: Smita Lake RN         Estimated Blood Loss: none    Urine Voided: * No values recorded between 4/11/2022  9:08 AM and 4/11/2022  9:32 AM *    Specimens:                None          Drains: * No LDAs found *    Findings: Colonoscopy to the cecum and ileocecal valve with normal colonic mucosa.  Sigmoid diverticulosis and internal hemorrhoids were noted.    Complications: None          Roby Orlando MD     Date: 4/11/2022  Time: 09:34 EDT

## 2022-07-28 ENCOUNTER — TELEPHONE (OUTPATIENT)
Dept: GASTROENTEROLOGY | Facility: CLINIC | Age: 77
End: 2022-07-28

## 2022-07-28 RX ORDER — PANCRELIPASE 36000; 180000; 114000 [USP'U]/1; [USP'U]/1; [USP'U]/1
36000 CAPSULE, DELAYED RELEASE PELLETS ORAL
Qty: 90 CAPSULE | Refills: 1 | Status: SHIPPED | OUTPATIENT
Start: 2022-07-28 | End: 2022-08-14 | Stop reason: SDUPTHER

## 2022-07-28 NOTE — TELEPHONE ENCOUNTER
Pt called and reports she needs a refill of her creon.  Message sent to Dr Orlando.       Called pt back and advised that she is due for her yearly f/u.  Appt made for 08/23 at 115p with Dr Orlando. Creon refill sent to get pt by till her appt.

## 2022-08-14 RX ORDER — PANCRELIPASE 36000; 180000; 114000 [USP'U]/1; [USP'U]/1; [USP'U]/1
36000 CAPSULE, DELAYED RELEASE PELLETS ORAL
Qty: 90 CAPSULE | Refills: 1 | Status: SHIPPED | OUTPATIENT
Start: 2022-08-14 | End: 2022-10-11 | Stop reason: SDUPTHER

## 2022-10-11 ENCOUNTER — OFFICE VISIT (OUTPATIENT)
Dept: GASTROENTEROLOGY | Facility: CLINIC | Age: 77
End: 2022-10-11

## 2022-10-11 VITALS
BODY MASS INDEX: 22.4 KG/M2 | WEIGHT: 147.8 LBS | TEMPERATURE: 96.4 F | SYSTOLIC BLOOD PRESSURE: 115 MMHG | HEIGHT: 68 IN | HEART RATE: 63 BPM | DIASTOLIC BLOOD PRESSURE: 66 MMHG | OXYGEN SATURATION: 96 %

## 2022-10-11 DIAGNOSIS — K86.2 PANCREATIC CYST: Primary | ICD-10-CM

## 2022-10-11 PROCEDURE — 99212 OFFICE O/P EST SF 10 MIN: CPT | Performed by: INTERNAL MEDICINE

## 2022-10-11 RX ORDER — DORZOLAMIDE HYDROCHLORIDE AND TIMOLOL MALEATE 20; 5 MG/ML; MG/ML
1 SOLUTION/ DROPS OPHTHALMIC
COMMUNITY
Start: 2022-08-18

## 2022-10-11 RX ORDER — PANCRELIPASE 36000; 180000; 114000 [USP'U]/1; [USP'U]/1; [USP'U]/1
36000 CAPSULE, DELAYED RELEASE PELLETS ORAL
Qty: 270 CAPSULE | Refills: 3 | Status: SHIPPED | OUTPATIENT
Start: 2022-10-11 | End: 2022-10-23

## 2022-10-11 RX ORDER — PILOCARPINE HYDROCHLORIDE 20 MG/ML
SOLUTION/ DROPS OPHTHALMIC
COMMUNITY
Start: 2022-07-27

## 2022-10-11 RX ORDER — VERAPAMIL HYDROCHLORIDE 180 MG/1
180 CAPSULE, EXTENDED RELEASE ORAL
COMMUNITY
Start: 2022-08-18

## 2022-10-11 NOTE — PROGRESS NOTES
Chief Complaint   Patient presents with   • Med Refill       Elsie Burleson is a  77 y.o. female here for a follow up visit for med refill.    HPI     Patient 77-year-old female with history of hyperlipidemia, GERD and degenerative disc disease here for follow-up for her serous cystadenoma of the pancreas.  Patient currently with no GI complaints as long as she takes her Creon with each meal doing well clinically.    Past Medical History:   Diagnosis Date   • Abdominal pain    • Anxiety    • DDD (degenerative disc disease), lumbar 06/25/2019   • Degenerative spondylolisthesis, L4-5    • Diverticulitis of colon 1990   • GERD (gastroesophageal reflux disease)    • Glaucoma    • Greater trochanteric bursitis     left   • High cholesterol    • History of chicken pox     Childhood   • History of diverticulosis    • History of foreign travel 02/2017    [place]    • History of infectious mononucleosis    • History of mitral valve prolapse    • Infectious viral hepatitis 1976    A   • Irregular heart beat     ON MED SEVERAL YEARS   • Low back pain     GETTING EPIDURALS, DR CHAPA   • Lumbar radiculitis 06/25/2019   • Osteoarthritis     Spine w/radiculopathy, lumbar region   • Peptic ulceration    • PONV (postoperative nausea and vomiting)    • Restless leg    • S/P epidural steroid injection    • Sleep apnea with use of continuous positive airway pressure (CPAP)    • Umbilical hernia          Current Outpatient Medications:   •  ALPRAZolam (XANAX) 0.5 MG tablet, Take 0.5 mg by mouth 3 (Three) Times a Day As Needed., Disp: , Rfl:   •  brimonidine-timolol (COMBIGAN) 0.2-0.5 % ophthalmic solution, 1 drop 2 (Two) Times a Day., Disp: , Rfl:   •  Cetirizine HCl (ZYRTEC ALLERGY PO), Take 1 tablet by mouth Daily., Disp: , Rfl:   •  Coenzyme Q10-Levocarnitine 100-20 MG capsule, Take 1 tablet by mouth Daily. PT TO HOLD MED PRIOR TO OR, Disp: , Rfl:   •  Dorzolamide HCl-Timolol Mal PF (Cosopt PF) 22.3-6.8 MG/ML ophthalmic  solution, 1 drop., Disp: , Rfl:   •  EPINEPHRINE IJ, Inject  as directed As Needed (pt has epi pen)., Disp: , Rfl:   •  esomeprazole (nexIUM) 20 MG capsule, Take 1 capsule by mouth Daily. (Patient taking differently: Take 1 capsule by mouth 2 (Two) Times a Day.), Disp: 90 capsule, Rfl: 3  •  Glucosamine-Chondroitin 250-200 MG capsule, Take 1 capsule by mouth Daily., Disp: , Rfl:   •  guaiFENesin (MUCINEX) 600 MG 12 hr tablet, Take 1 tablet by mouth Daily., Disp: , Rfl:   •  levothyroxine (SYNTHROID, LEVOTHROID) 50 MCG tablet, , Disp: , Rfl:   •  Magnesium Oxide 200 MG tablet, Take 2 tablets by mouth Daily., Disp: , Rfl:   •  Nutritional Supplements (JUICE PLUS FIBRE PO), Take 2 tablets by mouth Daily., Disp: , Rfl:   •  Pancrelipase, Lip-Prot-Amyl, (Creon) [id number] units capsule delayed-release particles capsule, Take 1 capsule by mouth 3 (Three) Times a Day With Meals., Disp: 270 capsule, Rfl: 3  •  pilocarpine (PILOCAR) 2 % ophthalmic solution, , Disp: , Rfl:   •  polycarbophil (calcium polycarbophil) 625 MG tablet tablet, Take 625 mg by mouth 4 (Four) Times a Day., Disp: , Rfl:   •  Propylene Glycol 0.6 % solution, 1 mL Every 4 (Four) Hours., Disp: , Rfl:   •  rosuvastatin (CRESTOR) 5 MG tablet, Take 1 tablet by mouth., Disp: , Rfl:   •  UBIQUINOL PO, Take 100 mg by mouth Daily., Disp: , Rfl:   •  Unable to find, Take 1 each by mouth Daily. RELIEF FACTOR- 4 CAPSULES DAILY PT TO HOLD MED PRIOR TO OR, Disp: , Rfl:   •  verapamil ER (VERELAN) 180 MG 24 hr capsule, 1 capsule., Disp: , Rfl:   •  Ezetimibe (ZETIA PO), Take 10 mg by mouth Daily., Disp: , Rfl:   •  montelukast (SINGULAIR) 10 MG tablet, Take 10 mg by mouth Every Night., Disp: , Rfl:   •  Nutritional Supplements (JUICE PLUS FIBRE PO), Take 1 can by mouth., Disp: , Rfl:     Allergies   Allergen Reactions   • Ceclor [Cefaclor] Anaphylaxis and Swelling     THROAT SWELLING   • Cherry Anaphylaxis     DIFFICULTY BREATHING   • Brimonidine Tartrate-Timolol  Other (See Comments)     EYE REDNESS   • Bee Venom Hives       Social History     Socioeconomic History   • Marital status:      Spouse name: Ellis   • Number of children: 2   • Years of education: College   Tobacco Use   • Smoking status: Former     Packs/day: 0.50     Types: Cigarettes     Start date: 1963     Quit date: 1995     Years since quittin.7   • Smokeless tobacco: Never   • Tobacco comments:     QUIT 22 YEARS AGO//25 yrs, 1 ppd   Vaping Use   • Vaping Use: Never used   Substance and Sexual Activity   • Alcohol use: Yes     Alcohol/week: 1.0 standard drink     Types: 1 Glasses of wine per week     Comment: per day   • Drug use: No   • Sexual activity: Defer       Family History   Problem Relation Age of Onset   • Heart disease Mother    • Hyperlipidemia Mother    • Stroke Mother    • Hypertension Mother    • Heart disease Father    • Heart disease Brother    • Arthritis Brother    • Hyperlipidemia Brother    • Stroke Brother    • Hypertension Brother    • Malig Hyperthermia Neg Hx    • Colon cancer Neg Hx    • Colon polyps Neg Hx        Review of Systems   Constitutional: Negative.    HENT: Negative.    Eyes: Negative.    Respiratory: Negative.    Cardiovascular: Negative.    Gastrointestinal: Negative.    Musculoskeletal: Negative.    Skin: Negative.    Hematological: Negative.        Vitals:    10/11/22 1152   BP: 115/66   Pulse: 63   Temp: 96.4 °F (35.8 °C)   SpO2: 96%       Physical Exam  Vitals and nursing note reviewed.   Constitutional:       Appearance: Normal appearance. She is well-developed and normal weight.   HENT:      Head: Normocephalic and atraumatic.   Eyes:      General: No scleral icterus.     Pupils: Pupils are equal, round, and reactive to light.   Cardiovascular:      Rate and Rhythm: Normal rate and regular rhythm.      Heart sounds: Normal heart sounds. No murmur heard.    No friction rub. No gallop.   Pulmonary:      Effort: Pulmonary effort is normal.       Breath sounds: Normal breath sounds. No wheezing or rales.   Abdominal:      General: Bowel sounds are normal. There is no distension or abdominal bruit.      Palpations: Abdomen is soft. Abdomen is not rigid. There is no shifting dullness, fluid wave, mass or pulsatile mass.      Tenderness: There is no abdominal tenderness. There is no guarding.      Hernia: No hernia is present.   Musculoskeletal:         General: No swelling or tenderness. Normal range of motion.   Skin:     General: Skin is warm and dry.      Coloration: Skin is not jaundiced.      Findings: No rash.   Neurological:      General: No focal deficit present.      Mental Status: She is alert and oriented to person, place, and time.      Cranial Nerves: No cranial nerve deficit.   Psychiatric:         Behavior: Behavior normal.         Thought Content: Thought content normal.         No visits with results within 2 Month(s) from this visit.   Latest known visit with results is:   Hospital Outpatient Visit on 05/03/2021   Component Date Value Ref Range Status   • Creatinine 05/03/2021 0.70  0.60 - 1.30 mg/dL Final       Diagnoses and all orders for this visit:    1. Pancreatic cyst (Primary)    Other orders  -     Pancrelipase, Lip-Prot-Amyl, (Creon) [id number] units capsule delayed-release particles capsule; Take 1 capsule by mouth 3 (Three) Times a Day With Meals.  Dispense: 270 capsule; Refill: 3      Patient 77-year-old female with history of hyperlipidemia, degenerative disc disease and serous cystadenoma of the pancreas here for follow-up.  Patient reports as long as she takes her Creon the nausea and discomfort that occurred in the past does not and she feels well.  Patient recently with a severe UTI had CT at [facility] read as pancreatic cancer however still having routine follow-ups with [facility] concerning her cystadenoma.  We will be available as needed and will refill the Creon.

## 2022-10-23 RX ORDER — PANCRELIPASE 36000; 180000; 114000 [USP'U]/1; [USP'U]/1; [USP'U]/1
CAPSULE, DELAYED RELEASE PELLETS ORAL
Qty: 90 CAPSULE | Refills: 1 | Status: SHIPPED | OUTPATIENT
Start: 2022-10-23 | End: 2022-12-16 | Stop reason: SDUPTHER

## 2022-12-16 NOTE — TELEPHONE ENCOUNTER
Caller: Elsie Burleson    Relationship: Self    Best call back number: [phone]    Requested Prescriptions:   Requested Prescriptions     Pending Prescriptions Disp Refills   • Pancrelipase, Lip-Prot-Amyl, (Creon) [id number] units capsule delayed-release particles capsule 90 capsule 1        Pharmacy where request should be sent: [facility] - [place] [address] [facility] [phone]  - [phone] FX     Additional details provided by patient: PT SAYS SHE CALLED PHARMACY AND THEY SAID THERE WAS NO MORE REFILLS.     Does the patient have less than a 3 day supply:  [] Yes  [x] No    Would you like a call back once the refill request has been completed: [x] Yes [] No    If the office needs to give you a call back, can they leave a voicemail: [x] Yes [] No    Sarah Koch Rep   12/16/22 12:28 EST

## 2022-12-19 RX ORDER — PANCRELIPASE 36000; 180000; 114000 [USP'U]/1; [USP'U]/1; [USP'U]/1
36000 CAPSULE, DELAYED RELEASE PELLETS ORAL
Qty: 270 CAPSULE | Refills: 3 | Status: SHIPPED | OUTPATIENT
Start: 2022-12-19

## 2023-05-19 ENCOUNTER — TELEPHONE (OUTPATIENT)
Dept: GASTROENTEROLOGY | Facility: CLINIC | Age: 78
End: 2023-05-19
Payer: MEDICARE

## 2023-05-19 RX ORDER — PANCRELIPASE 36000; 180000; 114000 [USP'U]/1; [USP'U]/1; [USP'U]/1
36000 CAPSULE, DELAYED RELEASE PELLETS ORAL
Qty: 90 CAPSULE | Refills: 5 | Status: SHIPPED | OUTPATIENT
Start: 2023-05-19

## 2023-05-19 NOTE — TELEPHONE ENCOUNTER
Returned patient's phone call. She states she is in the donut hole and needs her medication prescription month to month. New script written, awaiting MD signature.     F/u with Dr. Orlando on 10/03/23@9462.

## 2023-05-19 NOTE — TELEPHONE ENCOUNTER
Caller: Elsie Burleson    Relationship: Self    Best call back number:[phone]    Requested Prescriptions:   Requested Prescriptions     Pending Prescriptions Disp Refills   • Pancrelipase, Lip-Prot-Amyl, (Creon) [id number] units capsule delayed-release particles capsule 270 capsule 3     Sig: Take 1 capsule by mouth 3 (Three) Times a Day With Meals.        Pharmacy where request should be sent: [facility] - [address] [phone] [facility] [phone] FX     Last office visit with prescribing clinician: 10/11/2022   Last telemedicine visit with prescribing clinician: 12/16/2022   Next office visit with prescribing clinician: Visit date not found     Additional details provided by patient: [facility] BE A MONTHLY PRESCRIPTION INSTEAD OF 3 MONTH SUPPLY    Does the patient have less than a 3 day supply:  [] Yes  [x] No    Would you like a call back once the refill request has been completed: [x] Yes [] No    If the office needs to give you a call back, can they leave a voicemail: [x] Yes [] No    Coleen Lomas, RegSched Rep   05/19/23 15:51 EDT

## 2023-11-22 ENCOUNTER — TELEPHONE (OUTPATIENT)
Dept: GASTROENTEROLOGY | Facility: CLINIC | Age: 78
End: 2023-11-22
Payer: MEDICARE

## 2023-11-22 NOTE — TELEPHONE ENCOUNTER
Caller: [facility] - [place], [place] - [address] - [phone] [facility] [phone] FX    Relationship to patient: Pharmacy    Best call back number: [phone] REF# [id number]    Patient is needing: CLARIFICATION CREON CAPSULE. DR. NERI WROTE FOR 30 DAY SUPPLY BUT PHARMACY WANTED TO CLARIFY IF THEY COULD FILL FOR PACK  SINCE THEY COME IN UNBREAKABLE PACKS NOW.

## 2023-11-27 ENCOUNTER — TELEPHONE (OUTPATIENT)
Dept: GASTROENTEROLOGY | Facility: CLINIC | Age: 78
End: 2023-11-27

## 2023-11-27 ENCOUNTER — TELEPHONE (OUTPATIENT)
Dept: GASTROENTEROLOGY | Facility: CLINIC | Age: 78
End: 2023-11-27
Payer: MEDICARE

## 2023-11-27 NOTE — TELEPHONE ENCOUNTER
Caller: Elsie Burleson    Relationship: Self    Best call back number:[phone]    Requested Prescriptions: Pancrelipase, Lip-Prot-Amyl, (Creon) [id number] units capsule delayed-release particles capsule   Requested Prescriptions      No prescriptions requested or ordered in this encounter        Pharmacy where request should be sent:  [facility] - [place], [place] - [address] [facility] - [phone] [facility] [phone]  [facility] [address] [facility] [address]     Last office visit with prescribing clinician: 10/11/2022   Last telemedicine visit with prescribing clinician: Visit date not found   Next office visit with prescribing clinician: 12.21.23    Additional details provided by patient: PT STATES THE PHARMACY HAS CHANGED FROM 90 CAPSULES  CAPSULES AND HER CURRENT PRESCRIPTION IS FOR 90 SO THEY WILL NOT REFILL.    Does the patient have less than a 3 day supply:  [x] Yes  [] No    Would you like a call back once the refill request has been completed: [x] Yes [] No    If the office needs to give you a call back, can they leave a voicemail: [x] Yes [] No    Sarah Desai Rep   11/27/23 09:16 EST

## 2023-11-27 NOTE — TELEPHONE ENCOUNTER
Saman, pharmacy tech at [facility] mail order pharmacy.     Per verbal order of Dr. Orlando: Hansa [id number] one capsule with meals and one with snack as needed.   Ok to dispense 90 day supply.   Patient called and advise script sent. She verb understanding.

## 2023-11-27 NOTE — TELEPHONE ENCOUNTER
Hub staff attempted to follow warm transfer process and was unsuccessful     Caller: Elsie Burleson    Relationship to patient: Self    Best call back number: [phone]     Patient is needing: PLEASE SEE NOTE FROM 11/22/23.  CREON CAPSULE. PHARMACY WANTED TO CLARIFY IF THEY COULD FILL FOR PACK  SINCE THEY COME IN UNBREAKABLE PACKS NOW. #[phone] DIRECT # FOR ORDER CLARIFICATION.

## 2023-12-22 RX ORDER — PANCRELIPASE 36000; 180000; 114000 [USP'U]/1; [USP'U]/1; [USP'U]/1
36000 CAPSULE, DELAYED RELEASE PELLETS ORAL
Qty: 90 CAPSULE | Refills: 1 | Status: SHIPPED | OUTPATIENT
Start: 2023-12-22

## 2023-12-22 NOTE — TELEPHONE ENCOUNTER
Caller: Elsie Burleson    Relationship: Self    Best call back number: [phone]    Requested Prescriptions: 30 DAY SCRIPT OF Pancrelipase, Lip-Prot-Amyl, (Creon) [id number] units capsule delayed-release particles capsule      Pharmacy where request should be sent: [facility] - [place], [place] - [address] - [phone]  - [phone] FX     Last office visit with prescribing clinician: 10/11/2022     Additional details provided by patient: PATIENT ONLY HAS A FEW DAYS LEFT AND IS TRYING TO GET THIS REFILLED BEFORE THE HOLIDAYS.     Would you like a call back once the refill request has been completed: [] Yes [x] No    Sarah Shen Rep   12/22/23 09:51 EST

## 2023-12-29 ENCOUNTER — TELEPHONE (OUTPATIENT)
Dept: GASTROENTEROLOGY | Facility: CLINIC | Age: 78
End: 2023-12-29
Payer: MEDICARE

## 2023-12-29 NOTE — TELEPHONE ENCOUNTER
Hub staff attempted to follow warm transfer process and was unsuccessful     Caller: [facility] - [place], [place] - [address] - [phone]  - [phone] FX    Relationship to patient: Pharmacy    Best call back number: [phone]    REFERENCE# [id number]    Patient is needing: KT CALLING FROM PHARMACY TO LEAVE MESSAGE FOR DR NERI.  WANTS TO CLARIFY QUANTITY FOR THE CREON CAPSULE. ONLY AVAIL IN PACKS .  PLEASE REACH OUT TO CLARIFY.

## 2024-02-02 RX ORDER — PANCRELIPASE 36000; 180000; 114000 [USP'U]/1; [USP'U]/1; [USP'U]/1
36000 CAPSULE, DELAYED RELEASE PELLETS ORAL
Qty: 270 CAPSULE | Refills: 3 | Status: SHIPPED | OUTPATIENT
Start: 2024-02-02

## 2024-02-16 ENCOUNTER — OFFICE VISIT (OUTPATIENT)
Dept: GASTROENTEROLOGY | Facility: CLINIC | Age: 79
End: 2024-02-16
Payer: MEDICARE

## 2024-02-16 ENCOUNTER — LAB (OUTPATIENT)
Dept: LAB | Facility: HOSPITAL | Age: 79
End: 2024-02-16
Payer: MEDICARE

## 2024-02-16 VITALS
HEIGHT: 68 IN | HEART RATE: 62 BPM | DIASTOLIC BLOOD PRESSURE: 62 MMHG | OXYGEN SATURATION: 97 % | BODY MASS INDEX: 23.46 KG/M2 | TEMPERATURE: 96.4 F | WEIGHT: 154.8 LBS | SYSTOLIC BLOOD PRESSURE: 115 MMHG

## 2024-02-16 DIAGNOSIS — K86.2 PANCREATIC CYST: Primary | ICD-10-CM

## 2024-02-16 DIAGNOSIS — R97.8 OTHER ABNORMAL TUMOR MARKERS: ICD-10-CM

## 2024-02-16 LAB
ALBUMIN SERPL-MCNC: 4.4 G/DL (ref 3.5–5.2)
ALBUMIN/GLOB SERPL: 2 G/DL
ALP SERPL-CCNC: 93 U/L (ref 39–117)
ALT SERPL W P-5'-P-CCNC: 20 U/L (ref 1–33)
ANION GAP SERPL CALCULATED.3IONS-SCNC: 9.5 MMOL/L (ref 5–15)
AST SERPL-CCNC: 20 U/L (ref 1–32)
BILIRUB SERPL-MCNC: 0.5 MG/DL (ref 0–1.2)
BUN SERPL-MCNC: 15 MG/DL (ref 8–23)
BUN/CREAT SERPL: 22.7 (ref 7–25)
CALCIUM SPEC-SCNC: 9.1 MG/DL (ref 8.6–10.5)
CANCER AG19-9 SERPL-ACNC: 9.8 U/ML
CHLORIDE SERPL-SCNC: 108 MMOL/L (ref 98–107)
CO2 SERPL-SCNC: 24.5 MMOL/L (ref 22–29)
CREAT SERPL-MCNC: 0.66 MG/DL (ref 0.57–1)
DEPRECATED RDW RBC AUTO: 41.7 FL (ref 37–54)
EGFRCR SERPLBLD CKD-EPI 2021: 89.9 ML/MIN/1.73
ERYTHROCYTE [DISTWIDTH] IN BLOOD BY AUTOMATED COUNT: 12.7 % (ref 12.3–15.4)
GLOBULIN UR ELPH-MCNC: 2.2 GM/DL
GLUCOSE SERPL-MCNC: 130 MG/DL (ref 65–99)
HCT VFR BLD AUTO: 38.7 % (ref 34–46.6)
HGB BLD-MCNC: 12.8 G/DL (ref 12–15.9)
MCH RBC QN AUTO: 30.1 PG (ref 26.6–33)
MCHC RBC AUTO-ENTMCNC: 33.1 G/DL (ref 31.5–35.7)
MCV RBC AUTO: 91.1 FL (ref 79–97)
PLATELET # BLD AUTO: 249 10*3/MM3 (ref 140–450)
PMV BLD AUTO: 10.4 FL (ref 6–12)
POTASSIUM SERPL-SCNC: 4.1 MMOL/L (ref 3.5–5.2)
PROT SERPL-MCNC: 6.6 G/DL (ref 6–8.5)
RBC # BLD AUTO: 4.25 10*6/MM3 (ref 3.77–5.28)
SODIUM SERPL-SCNC: 142 MMOL/L (ref 136–145)
WBC NRBC COR # BLD AUTO: 7.34 10*3/MM3 (ref 3.4–10.8)

## 2024-02-16 PROCEDURE — 1160F RVW MEDS BY RX/DR IN RCRD: CPT | Performed by: NURSE PRACTITIONER

## 2024-02-16 PROCEDURE — 86301 IMMUNOASSAY TUMOR CA 19-9: CPT | Performed by: NURSE PRACTITIONER

## 2024-02-16 PROCEDURE — 85027 COMPLETE CBC AUTOMATED: CPT | Performed by: NURSE PRACTITIONER

## 2024-02-16 PROCEDURE — 80053 COMPREHEN METABOLIC PANEL: CPT | Performed by: NURSE PRACTITIONER

## 2024-02-16 PROCEDURE — 1159F MED LIST DOCD IN RCRD: CPT | Performed by: NURSE PRACTITIONER

## 2024-02-16 PROCEDURE — 36415 COLL VENOUS BLD VENIPUNCTURE: CPT | Performed by: NURSE PRACTITIONER

## 2024-02-16 PROCEDURE — 99213 OFFICE O/P EST LOW 20 MIN: CPT | Performed by: NURSE PRACTITIONER

## 2024-02-16 RX ORDER — UBIDECARENONE 100 MG
100 CAPSULE ORAL
COMMUNITY
Start: 2022-08-18

## 2024-02-16 RX ORDER — GLUCOSAMINE/CHONDR SU A SOD 750-600 MG
1500 TABLET ORAL
COMMUNITY
Start: 2022-08-18

## 2024-02-16 RX ORDER — PANCRELIPASE 36000; 180000; 114000 [USP'U]/1; [USP'U]/1; [USP'U]/1
36000 CAPSULE, DELAYED RELEASE PELLETS ORAL
Qty: 270 CAPSULE | Refills: 3 | Status: SHIPPED | OUTPATIENT
Start: 2024-02-16

## 2024-02-16 RX ORDER — PANCRELIPASE 60000; 12000; 38000 [USP'U]/1; [USP'U]/1; [USP'U]/1
CAPSULE, DELAYED RELEASE PELLETS ORAL
COMMUNITY
End: 2024-02-16

## 2024-02-16 RX ORDER — AZELASTINE HCL 205.5 UG/1
SPRAY NASAL
COMMUNITY
Start: 2022-08-18

## 2024-02-16 RX ORDER — ROSUVASTATIN CALCIUM 5 MG/1
TABLET, COATED ORAL
COMMUNITY

## 2024-02-16 RX ORDER — TAFLUPROST OPTHALMIC 0 MG/.3ML
1 SOLUTION/ DROPS OPHTHALMIC
COMMUNITY
Start: 2022-08-18

## 2024-02-19 ENCOUNTER — TELEPHONE (OUTPATIENT)
Dept: GASTROENTEROLOGY | Facility: CLINIC | Age: 79
End: 2024-02-19
Payer: MEDICARE

## 2024-02-19 NOTE — TELEPHONE ENCOUNTER
Pt reviewed results via [employer].     Sent pt [employer] msg advising of results and recommendations. Advised to call if any questions.

## 2024-02-19 NOTE — TELEPHONE ENCOUNTER
----- Message from NAGI Bains sent at 2/16/2024  4:05 PM EST -----  Please inform the patient lab work looks good.  Thanks Paz

## 2025-01-28 RX ORDER — PANCRELIPASE 36000; 180000; 114000 [USP'U]/1; [USP'U]/1; [USP'U]/1
36000 CAPSULE, DELAYED RELEASE PELLETS ORAL
Qty: 270 CAPSULE | Refills: 3 | Status: SHIPPED | OUTPATIENT
Start: 2025-01-28

## 2025-01-28 NOTE — TELEPHONE ENCOUNTER
Ok for the hub to relay and schedule. Left vm for the pt to call back and schedule annual follow up appt with Paz Burgos.

## 2025-03-25 ENCOUNTER — OFFICE VISIT (OUTPATIENT)
Dept: GASTROENTEROLOGY | Facility: CLINIC | Age: 80
End: 2025-03-25
Payer: MEDICARE

## 2025-03-25 VITALS
HEART RATE: 60 BPM | BODY MASS INDEX: 23.89 KG/M2 | DIASTOLIC BLOOD PRESSURE: 74 MMHG | WEIGHT: 157.6 LBS | HEIGHT: 68 IN | SYSTOLIC BLOOD PRESSURE: 118 MMHG | TEMPERATURE: 97.4 F

## 2025-03-25 DIAGNOSIS — K86.2 PANCREATIC CYST: Primary | ICD-10-CM

## 2025-03-25 DIAGNOSIS — D37.9 NEOPLASM OF UNCERTAIN BEHAVIOR OF DIGESTIVE ORGAN, UNSPECIFIED: ICD-10-CM

## 2025-03-25 LAB
ALBUMIN SERPL-MCNC: 4.1 G/DL (ref 3.5–5.2)
ALBUMIN/GLOB SERPL: 2 G/DL
ALP SERPL-CCNC: 72 U/L (ref 39–117)
ALT SERPL-CCNC: 17 U/L (ref 1–33)
AST SERPL-CCNC: 17 U/L (ref 1–32)
BILIRUB SERPL-MCNC: 0.7 MG/DL (ref 0–1.2)
BUN SERPL-MCNC: 17 MG/DL (ref 8–23)
BUN/CREAT SERPL: 24.6 (ref 7–25)
CALCIUM SERPL-MCNC: 9 MG/DL (ref 8.6–10.5)
CHLORIDE SERPL-SCNC: 106 MMOL/L (ref 98–107)
CO2 SERPL-SCNC: 23.7 MMOL/L (ref 22–29)
CREAT SERPL-MCNC: 0.69 MG/DL (ref 0.57–1)
EGFRCR SERPLBLD CKD-EPI 2021: 88.4 ML/MIN/1.73
ERYTHROCYTE [DISTWIDTH] IN BLOOD BY AUTOMATED COUNT: 13 % (ref 12.3–15.4)
GLOBULIN SER CALC-MCNC: 2.1 GM/DL
GLUCOSE SERPL-MCNC: 101 MG/DL (ref 65–99)
HCT VFR BLD AUTO: 37.7 % (ref 34–46.6)
HGB BLD-MCNC: 12.6 G/DL (ref 12–15.9)
MCH RBC QN AUTO: 30.1 PG (ref 26.6–33)
MCHC RBC AUTO-ENTMCNC: 33.4 G/DL (ref 31.5–35.7)
MCV RBC AUTO: 90 FL (ref 79–97)
PLATELET # BLD AUTO: 256 10*3/MM3 (ref 140–450)
POTASSIUM SERPL-SCNC: 4.7 MMOL/L (ref 3.5–5.2)
PROT SERPL-MCNC: 6.2 G/DL (ref 6–8.5)
RBC # BLD AUTO: 4.19 10*6/MM3 (ref 3.77–5.28)
SODIUM SERPL-SCNC: 141 MMOL/L (ref 136–145)
WBC # BLD AUTO: 5.4 10*3/MM3 (ref 3.4–10.8)

## 2025-03-25 PROCEDURE — 1159F MED LIST DOCD IN RCRD: CPT | Performed by: NURSE PRACTITIONER

## 2025-03-25 PROCEDURE — G2211 COMPLEX E/M VISIT ADD ON: HCPCS | Performed by: NURSE PRACTITIONER

## 2025-03-25 PROCEDURE — 99213 OFFICE O/P EST LOW 20 MIN: CPT | Performed by: NURSE PRACTITIONER

## 2025-03-25 PROCEDURE — 1160F RVW MEDS BY RX/DR IN RCRD: CPT | Performed by: NURSE PRACTITIONER

## 2025-03-25 RX ORDER — METOPROLOL TARTRATE 25 MG/1
TABLET, FILM COATED ORAL
COMMUNITY

## 2025-03-25 RX ORDER — DRONEDARONE 400 MG/1
TABLET, FILM COATED ORAL
COMMUNITY
Start: 2024-12-13

## 2025-03-25 RX ORDER — SODIUM FLUORIDE 6 MG/ML
PASTE, DENTIFRICE DENTAL
COMMUNITY
Start: 2024-12-18

## 2025-03-25 NOTE — PROGRESS NOTES
Chief Complaint   Patient presents with    Pancreatic cyst       HPI    Elise Burleson is a  79 y.o. female here for a follow up visit for pancreatic cyst.    This is a former patient of Dr. Orlando's that is known to me.    Past medical history of anxiety, hyperlipidemia, GERD and DDD.    On visit today she reports doing quite well.  She takes Creon 1 tablet 3 times a day.  Recent refill provided.  Denies abdominal pain, poor appetite or weight loss in fact she has gained weight.  She is due for surveillance imaging of pancreatic cyst.    Additional data reviewed:     C-scope spring 2022 - Normal ileum, diverticulosis, nonbleeding internal hemorrhoids.  Recall 10 years.    Past Medical History:   Diagnosis Date    Abdominal pain     Anxiety     DDD (degenerative disc disease), lumbar 06/25/2019    Degenerative spondylolisthesis, L4-5     Diverticulitis of colon 1990    GERD (gastroesophageal reflux disease)     Glaucoma     Greater trochanteric bursitis     left    High cholesterol     History of chicken pox     Childhood    History of diverticulosis     History of foreign travel 02/2017    [place]     History of infectious mononucleosis     History of mitral valve prolapse     Infectious viral hepatitis 1976    A    Irregular heart beat     ON MED SEVERAL YEARS    Low back pain     GETTING EPIDURALS, DR CHAPA    Lumbar radiculitis 06/25/2019    Osteoarthritis     Spine w/radiculopathy, lumbar region    Peptic ulceration     PONV (postoperative nausea and vomiting)     Restless leg     S/P epidural steroid injection     Sleep apnea with use of continuous positive airway pressure (CPAP)     Umbilical hernia        Past Surgical History:   Procedure Laterality Date    APPENDECTOMY  1964    BLADDER SURGERY  2012    BUNIONECTOMY Bilateral     CATARACT EXTRACTION Bilateral     COLONOSCOPY      COLONOSCOPY N/A 4/11/2022    Procedure: COLONOSCOPY TO CECUM;  Surgeon: Roby Orlando MD;  Location: [facility]  ENDOSCOPY;  Service: Gastroenterology;  Laterality: N/A;  PRE-  SCREENING  POST- DIVERTICULOSIS, HEMORRHOIDS    ENDOSCOPY  2017    EUS-  Mass in pancreatic body    EPIDURAL BLOCK      GLAUCOMA SURGERY Bilateral     HAMMER TOE REPAIR Right     HYSTERECTOMY  1992    SEN'S NEUROMA EXCISION Left     PELVIC FLOOR REPAIR      ROTATOR CUFF REPAIR      SHOULDER ARTHROSCOPY Right 2014    With rotator cuff repair 2 x 1.5 cm, subacromial decompression w/acromioplasty, debridement of labral tearing and biceps tenotomy and chondroplasty    SKIN BIOPSY      TONSILLECTOMY  1963    UMBILICAL HERNIA REPAIR N/A 2017    Procedure: UMBILICAL HERNIA REPAIR;  Surgeon: Haseeb Monterroso Jr., MD;  Location: [facility] OR;  Service:        Scheduled Meds:     Continuous Infusions: No current facility-administered medications for this visit.      PRN Meds:     Allergies   Allergen Reactions    Ceclor [Cefaclor] Anaphylaxis and Swelling     THROAT SWELLING    Cherry Anaphylaxis     DIFFICULTY BREATHING    Brimonidine Tartrate-Timolol Other (See Comments)     EYE REDNESS    Citalopram Hydrobromide Other (See Comments)     Made eyes red    Duloxetine Other (See Comments)    Fluticasone Furoate-Vilanterol Other (See Comments)     Nervous    Rosuvastatin Calcium Other (See Comments)     Muscle weakness    Bee Venom Hives       Social History     Socioeconomic History    Marital status:      Spouse name: Ellis    Number of children: 2    Years of education: College   Tobacco Use    Smoking status: Former     Current packs/day: 0.00     Average packs/day: 0.5 packs/day for 32.0 years (16.0 ttl pk-yrs)     Types: Cigarettes     Start date: 1963     Quit date: 1995     Years since quittin.2    Smokeless tobacco: Never    Tobacco comments:     QUIT 22 YEARS AGO//25 yrs, 1 ppd   Vaping Use    Vaping status: Never Used   Substance and Sexual Activity    Alcohol use: Yes     Alcohol/week: 1.0 standard drink  of alcohol     Types: 1 Glasses of wine per week     Comment: per day    Drug use: No    Sexual activity: Defer       Family History   Problem Relation Age of Onset    Heart disease Mother     Hyperlipidemia Mother     Stroke Mother     Hypertension Mother     Heart disease Father     Heart disease Brother     Arthritis Brother     Hyperlipidemia Brother     Stroke Brother     Hypertension Brother     Malig Hyperthermia Neg Hx     Colon cancer Neg Hx     Colon polyps Neg Hx        Review of Systems   Gastrointestinal: Negative.        Vitals:    03/25/25 1033   BP: 118/74   Pulse: 60   Temp: 97.4 °F (36.3 °C)       Physical Exam  Constitutional:       Appearance: She is well-developed.   Abdominal:      General: Bowel sounds are normal. There is no distension.      Palpations: Abdomen is soft. There is no mass.      Tenderness: There is no abdominal tenderness. There is no guarding.      Hernia: No hernia is present.   Skin:     General: Skin is warm and dry.      Capillary Refill: Capillary refill takes less than 2 seconds.   Neurological:      Mental Status: She is alert and oriented to person, place, and time.   Psychiatric:         Behavior: Behavior normal.     Assessment    Diagnoses and all orders for this visit:    Pancreatic cyst (Primary)  -     CBC (No Diff)  -     Comprehensive Metabolic Panel  -     Cancer Antigen 19-9  -     MRI abdomen w wo contrast mrcp    Plan    Continue Creon  Schedule surveillance MRCP  Labs today as above  Follow-up 1 year or sooner if needed         NAGI Bains  [facility]  [address] [place] [address]  Office: [phone]

## 2025-03-26 LAB — CANCER AG19-9 SERPL-ACNC: 10 U/ML (ref 0–35)

## 2025-03-27 ENCOUNTER — RESULTS FOLLOW-UP (OUTPATIENT)
Dept: GASTROENTEROLOGY | Facility: CLINIC | Age: 80
End: 2025-03-27

## 2025-05-08 ENCOUNTER — HOSPITAL ENCOUNTER (OUTPATIENT)
Dept: MRI IMAGING | Facility: HOSPITAL | Age: 80
Discharge: HOME | End: 2025-05-08
Admitting: NURSE PRACTITIONER
Payer: MEDICARE

## 2025-05-08 PROCEDURE — 74183 MRI ABD W/O CNTR FLWD CNTR: CPT

## 2025-05-08 PROCEDURE — A9577 INJ MULTIHANCE: HCPCS | Performed by: NURSE PRACTITIONER

## 2025-05-08 PROCEDURE — 25510000002 GADOBENATE DIMEGLUMINE 529 MG/ML SOLUTION: Performed by: NURSE PRACTITIONER

## 2025-05-08 RX ADMIN — GADOBENATE DIMEGLUMINE 14 ML: 529 INJECTION, SOLUTION INTRAVENOUS at 09:21

## 2025-05-15 ENCOUNTER — TELEPHONE (OUTPATIENT)
Dept: GASTROENTEROLOGY | Facility: CLINIC | Age: 80
End: 2025-05-15
Payer: MEDICARE

## 2025-05-20 DIAGNOSIS — R93.5 ABNORMAL MRI OF ABDOMEN: ICD-10-CM

## 2025-05-20 DIAGNOSIS — K86.2 PANCREATIC CYST: Primary | ICD-10-CM

## 2025-05-20 NOTE — PROGRESS NOTES
I spoke with patient over the phone regarding her MRI findings.  She was agreeable to referral to see Dr. Maravilla.  Referral placed.  Can you expedite consultation please?  Thank you

## (undated) DEVICE — SINGLE-USE BIOPSY FORCEPS: Brand: RADIAL JAW 4

## (undated) DEVICE — CANN O2 ETCO2 FITS ALL CONN CO2 SMPL A/ 7IN DISP LF

## (undated) DEVICE — ANTIBACTERIAL UNDYED BRAIDED (POLYGLACTIN 910), SYNTHETIC ABSORBABLE SUTURE: Brand: COATED VICRYL

## (undated) DEVICE — LN SMPL CO2 SHTRM SD STREAM W/M LUER

## (undated) DEVICE — CANN NASL CO2 TRULINK W/O2 A/

## (undated) DEVICE — TUBING, SUCTION, 1/4" X 10', STRAIGHT: Brand: MEDLINE

## (undated) DEVICE — SENSR O2 OXIMAX FNGR A/ 18IN NONSTR

## (undated) DEVICE — ENDOSCOPIC ULTRASOUND ASPIRATION NEEDLE: Brand: EXPECT SLIMLINE SL

## (undated) DEVICE — NDL HYPO PRECISIONGLIDE REG 25G 1 1/2

## (undated) DEVICE — 3M™ STERI-STRIP™ REINFORCED ADHESIVE SKIN CLOSURES, R1547, 1/2 IN X 4 IN (12 MM X 100 MM), 6 STRIPS/ENVELOPE: Brand: 3M™ STERI-STRIP™

## (undated) DEVICE — BITEBLOCK OMNI BLOC

## (undated) DEVICE — SUT VIC 3/0 TIES 18IN J110T

## (undated) DEVICE — ENCORE® LATEX ORTHO SIZE 7.5, STERILE LATEX POWDER-FREE SURGICAL GLOVE: Brand: ENCORE

## (undated) DEVICE — DRSNG SURESITE WNDW 4X4.5

## (undated) DEVICE — ADAPT CLN BIOGUARD AIR/H2O DISP

## (undated) DEVICE — APPL CHLORAPREP W/TINT 26ML ORNG

## (undated) DEVICE — SUT MNCRYL PLS ANTIB UD 4/0 PS2 18IN

## (undated) DEVICE — KT ORCA ORCAPOD DISP STRL

## (undated) DEVICE — TBG 02 CRUSH RESIST LF CLR 7FT

## (undated) DEVICE — LOU MINOR PROCEDURE: Brand: MEDLINE INDUSTRIES, INC.

## (undated) DEVICE — 3M™ STERI-STRIP™ COMPOUND BENZOIN TINCTURE 40 BAGS/CARTON 4 CARTONS/CASE C1544: Brand: 3M™ STERI-STRIP™

## (undated) DEVICE — SUT PDS O CT1 CR/8 18IN Z740D

## (undated) DEVICE — IRRIGATOR BULB ASEPTO 60CC STRL

## (undated) DEVICE — SPNG GZ STRL 2S 4X4 12PLY